# Patient Record
Sex: FEMALE | Race: WHITE | NOT HISPANIC OR LATINO | Employment: UNEMPLOYED | URBAN - METROPOLITAN AREA
[De-identification: names, ages, dates, MRNs, and addresses within clinical notes are randomized per-mention and may not be internally consistent; named-entity substitution may affect disease eponyms.]

---

## 2018-07-18 ENCOUNTER — HOSPITAL ENCOUNTER (OUTPATIENT)
Dept: RADIOLOGY | Facility: HOSPITAL | Age: 43
Discharge: HOME | End: 2018-07-18
Attending: ANESTHESIOLOGY
Payer: MEDICARE

## 2018-07-18 ENCOUNTER — OFFICE VISIT (OUTPATIENT)
Dept: PAIN MEDICINE | Facility: CLINIC | Age: 43
End: 2018-07-18
Payer: MEDICARE

## 2018-07-18 VITALS
BODY MASS INDEX: 45.13 KG/M2 | RESPIRATION RATE: 20 BRPM | DIASTOLIC BLOOD PRESSURE: 65 MMHG | HEIGHT: 59 IN | HEART RATE: 110 BPM | TEMPERATURE: 97 F | SYSTOLIC BLOOD PRESSURE: 116 MMHG | WEIGHT: 223.88 LBS | OXYGEN SATURATION: 96 %

## 2018-07-18 DIAGNOSIS — M25.561 CHRONIC PAIN OF BOTH KNEES: ICD-10-CM

## 2018-07-18 DIAGNOSIS — G89.29 CHRONIC PAIN OF BOTH KNEES: ICD-10-CM

## 2018-07-18 DIAGNOSIS — G89.29 CHRONIC PAIN OF RIGHT ANKLE: Primary | ICD-10-CM

## 2018-07-18 DIAGNOSIS — M54.2 NECK PAIN: ICD-10-CM

## 2018-07-18 DIAGNOSIS — Q85.1 TUBEROUS SCLEROSIS: ICD-10-CM

## 2018-07-18 DIAGNOSIS — M54.50 LOW BACK PAIN, NON-SPECIFIC: ICD-10-CM

## 2018-07-18 DIAGNOSIS — M25.562 CHRONIC PAIN OF BOTH KNEES: ICD-10-CM

## 2018-07-18 DIAGNOSIS — M51.36 DDD (DEGENERATIVE DISC DISEASE), LUMBAR: ICD-10-CM

## 2018-07-18 DIAGNOSIS — M25.571 CHRONIC PAIN OF RIGHT ANKLE: Primary | ICD-10-CM

## 2018-07-18 DIAGNOSIS — M25.571 CHRONIC PAIN OF RIGHT ANKLE: ICD-10-CM

## 2018-07-18 DIAGNOSIS — M62.838 MUSCLE SPASMS OF BOTH LOWER EXTREMITIES: ICD-10-CM

## 2018-07-18 DIAGNOSIS — G89.29 CHRONIC PAIN OF RIGHT ANKLE: ICD-10-CM

## 2018-07-18 PROCEDURE — 72050 X-RAY EXAM NECK SPINE 4/5VWS: CPT | Mod: 26,,, | Performed by: RADIOLOGY

## 2018-07-18 PROCEDURE — 99205 OFFICE O/P NEW HI 60 MIN: CPT | Mod: S$PBB,,, | Performed by: ANESTHESIOLOGY

## 2018-07-18 PROCEDURE — 73562 X-RAY EXAM OF KNEE 3: CPT | Mod: 26,50,, | Performed by: RADIOLOGY

## 2018-07-18 PROCEDURE — 99205 OFFICE O/P NEW HI 60 MIN: CPT | Mod: PBBFAC,25,PN | Performed by: ANESTHESIOLOGY

## 2018-07-18 PROCEDURE — 73610 X-RAY EXAM OF ANKLE: CPT | Mod: TC,PO,RT

## 2018-07-18 PROCEDURE — 73610 X-RAY EXAM OF ANKLE: CPT | Mod: 26,RT,, | Performed by: RADIOLOGY

## 2018-07-18 PROCEDURE — 72110 X-RAY EXAM L-2 SPINE 4/>VWS: CPT | Mod: TC,PO

## 2018-07-18 PROCEDURE — 72110 X-RAY EXAM L-2 SPINE 4/>VWS: CPT | Mod: 26,,, | Performed by: RADIOLOGY

## 2018-07-18 PROCEDURE — 73562 X-RAY EXAM OF KNEE 3: CPT | Mod: 50,TC,PO

## 2018-07-18 PROCEDURE — 99999 PR PBB SHADOW E&M-NEW PATIENT-LVL V: CPT | Mod: PBBFAC,,, | Performed by: ANESTHESIOLOGY

## 2018-07-18 PROCEDURE — 72050 X-RAY EXAM NECK SPINE 4/5VWS: CPT | Mod: TC,PO

## 2018-07-18 RX ORDER — MYCOPHENOLATE MOFETIL 500 MG/1
TABLET ORAL 2 TIMES DAILY
COMMUNITY
End: 2018-10-23 | Stop reason: SDUPTHER

## 2018-07-18 RX ORDER — QUETIAPINE FUMARATE 200 MG/1
200 TABLET, FILM COATED ORAL
COMMUNITY
End: 2019-05-21 | Stop reason: SDUPTHER

## 2018-07-18 RX ORDER — SULFAMETHOXAZOLE AND TRIMETHOPRIM 400; 80 MG/1; MG/1
TABLET ORAL
COMMUNITY
Start: 2018-07-09

## 2018-07-18 RX ORDER — SUMATRIPTAN 6 MG/.5ML
0.5 INJECTION, SOLUTION SUBCUTANEOUS
COMMUNITY

## 2018-07-18 RX ORDER — VALACYCLOVIR HYDROCHLORIDE 1 G/1
TABLET, FILM COATED ORAL
COMMUNITY
Start: 2017-12-05

## 2018-07-18 RX ORDER — LEVOTHYROXINE SODIUM 175 UG/1
TABLET ORAL
COMMUNITY
Start: 2018-07-06

## 2018-07-18 RX ORDER — CEFUROXIME AXETIL 250 MG/1
TABLET ORAL
COMMUNITY
Start: 2018-06-25 | End: 2019-05-21 | Stop reason: SDUPTHER

## 2018-07-18 RX ORDER — QUETIAPINE 200 MG/1
TABLET, FILM COATED, EXTENDED RELEASE ORAL
COMMUNITY
Start: 2018-07-12 | End: 2019-05-21 | Stop reason: SDUPTHER

## 2018-07-18 RX ORDER — PREDNISONE 5 MG/1
TABLET ORAL
COMMUNITY
Start: 2018-03-09

## 2018-07-18 RX ORDER — TIZANIDINE 4 MG/1
TABLET ORAL
COMMUNITY
Start: 2018-05-29 | End: 2018-07-18 | Stop reason: SDUPTHER

## 2018-07-18 RX ORDER — PROMETHAZINE HYDROCHLORIDE 25 MG/1
25 TABLET ORAL
COMMUNITY
Start: 2015-10-14 | End: 2018-10-23 | Stop reason: SDUPTHER

## 2018-07-18 RX ORDER — RIZATRIPTAN BENZOATE 10 MG/1
TABLET, ORALLY DISINTEGRATING ORAL
COMMUNITY
Start: 2018-06-25

## 2018-07-18 RX ORDER — TIZANIDINE 4 MG/1
4 TABLET ORAL NIGHTLY PRN
Qty: 30 TABLET | Refills: 1 | Status: SHIPPED | OUTPATIENT
Start: 2018-07-18 | End: 2018-09-13 | Stop reason: SDUPTHER

## 2018-07-18 RX ORDER — PERAMPANEL 8 MG/1
TABLET ORAL
COMMUNITY
Start: 2018-07-12

## 2018-07-18 RX ORDER — EVEROLIMUS 0.5 MG/1
TABLET ORAL
COMMUNITY
Start: 2018-07-02 | End: 2018-10-23 | Stop reason: SDUPTHER

## 2018-07-18 NOTE — PROGRESS NOTES
"This note was completed with dictation software and grammatical errors may exist.    CC: Pain in multiple locations    HPI: The patient is a 43-year-old woman with a history of tuberous sclerosis, kidney transplant, seizure disorder and chronic nerve pain who presents in self-referral.  The patient presents by herself so no one is available with her to help with providing history, she is tangential, difficult to get her to focus on one issue, difficult to get her to focus on explaining why she is present at the visit today.  She has a history of tuberous sclerosis and had undergone a kidney transplant after kidney failure in 2013.  She has had some issues with fevers and concerns for rejection, sounds like she is followed closely by the transplant team at [facility] in [place].    When asking her to describe what type of pain she has, she states that it is muscle cramps throughout her entire body, her entire right side, her neck and back.  She states that what bothers her most is the cramping in her legs.  She describes this as aching, burning, throbbing, tingling, numb, sharp and shooting.  She states that it radiates "all over ".  She states that it is worse with sitting, standing, laying down, bending, walking, extending or flexing her neck or her back.  She does report relief with alternate sitting and standing, resting, heat and cold, medications, especially Biofreeze.  She has also been taking Zanaflex 1- 1 and 1/2 tablets daily with mild relief, unclear if this causes any side effects.  She reports chronic right ankle pain due to several injuries, also chronic bilateral knee pain.    The patient has a history of tonic/clonic seizures induced by stress or sleep deprivation, history of 10 years of seizures occurring average of one time weekly and has failed multiple antiepileptic medications.  She had a vagal nerve stimulator placed but had infection and it was removed, April, 2017.  She sees " "a neurologist in [place], Dr. Laya Lacey with whom she follows closely.    Pain intervention history: She has previously seen pain management physicians in the past and it looks like she was taking oxycodone and OxyContin.  She may have taken some low-dose gabapentin in the past, unclear relief.  She did not feel that this was all that helpful.  She has been taking Zanaflex with some relief.  She uses Biofreeze on her legs and says this helps the most.  Unclear if she has done physical therapy recently.  She apparently had an EMG performed and was told that she had some nerve damage of some sort.      ROS: She reports fatigability, weight gain, itching, headaches, head trauma, appetite change, diarrhea, stomach pain, nausea, easy bruising, joint pain, urgency, frequency, painful urination, joint stiffness, joint swelling, back pain, seizures, dizziness, difficulty sleeping and loss of balance.  Balance of review of systems is negative.    Past Medical History:   Diagnosis Date    Seizures     Tuberous sclerosis        Past Surgical History:   Procedure Laterality Date    KIDNEY TRANSPLANT         Social History     Social History    Marital status:      Spouse name: N/A    Number of children: N/A    Years of education: N/A     Social History Main Topics    Smoking status: Never Smoker    Smokeless tobacco: Never Used    Alcohol use No    Drug use: No    Sexual activity: No     Other Topics Concern    None     Social History Narrative    None         Medications/Allergies: See med card    Vitals:    07/18/18 0827   BP: 116/65   Pulse: 110   Resp: 20   Temp: 96.5 °F (35.8 °C)   TempSrc: Oral   SpO2: 96%   Weight: 101.5 kg (223 lb 14 oz)   Height: 4' 11" (1.499 m)   PainSc:   6   PainLoc: Leg         Physical exam:    Gen: A and O x3, pleasant, well-groomed, morbidly obese  Skin: No rashes or obvious lesions  HEENT: PERRLA, no obvious deformities on ears or in canals. Trachea " midline.  CVS: Regular rate and rhythm, normal palpable pulses.  Resp:No increased work of breathing, symmetrical chest rise.  Abdomen: Soft, NT/ND.  Musculoskeletal: Slow, wide-based gait.     Neuro:  Lower extremities: 5/5 strength bilaterally  Reflexes: Patellar 1+, Achilles 1+ bilaterally.  Sensory:  Intact and symmetrical to light touch and pinprick in L2-S1 dermatomes bilaterally.    Lumbar spine:  Lumbar spine: Range of motion is moderately reduced with both flexion and extension with increased pain on both maneuvers.  Vinny's test causes no increased pain on either side.    Supine straight leg raise is negative bilaterally.    Internal and external rotation of the hip causes no increase  Myofascial exam: Tenderness to palpation across lumbar paraspinous muscles, cervical paraspinous region, thoracic paraspinous region..    Right ankle, no major deformities noted but does have pain with passive range of motion of the joint.  Passive range of motion with bilateral knees causes discomfort.  No swelling or erythema about the joints.    Imaging:  None    Assessment:   The patient is a 43-year-old woman with a history of tuberous sclerosis, kidney transplant, seizure disorder and chronic nerve pain who presents in self-referral.    1. Chronic pain of right ankle  CANCELED: X-Ray Ankle 2 View Right   2. Low back pain, non-specific  X-Ray Lumbar Spine Complete 5 View   3. DDD (degenerative disc disease), lumbar     4. Neck pain  X-Ray Cervical Spine Complete 5 view   5. Chronic pain of both knees  X-Ray Knee 3 View Left    X-Ray Knee 3 View Bilateral    CANCELED: X-Ray Knee 3 View Right   6. Tuberous sclerosis     7. Muscle spasms of both lower extremities           Plan:  1.  It is difficult to determine her main complaints but it sounds like cramping in her bilateral legs in addition to chronic joint pain in her bilateral knees and ankle.  We discussed trying to evaluate these symptoms.  I'm going to get  cervical and lumbar spine x-rays, bilateral knee x-rays and right ankle x-ray.  I'm going to get records from her previous pain management physicians, and also her neurologist.  I will have her follow-up in several weeks and we will review imaging and records.  In the meantime, I have given her prescription for Zanaflex to continue at night as needed.  We discussed if she is having side effects with drowsiness, this would not be optimal to use during the day but if it does not cause the symptoms, we could consider the increase the frequency.    Greater than 50% of this 60min visit was spent educating and counseling this patient.

## 2018-08-09 ENCOUNTER — TELEPHONE (OUTPATIENT)
Dept: PAIN MEDICINE | Facility: CLINIC | Age: 43
End: 2018-08-09

## 2018-08-09 ENCOUNTER — OFFICE VISIT (OUTPATIENT)
Dept: PAIN MEDICINE | Facility: CLINIC | Age: 43
End: 2018-08-09
Payer: MEDICARE

## 2018-08-09 ENCOUNTER — TELEPHONE (OUTPATIENT)
Dept: ORTHOPEDICS | Facility: CLINIC | Age: 43
End: 2018-08-09

## 2018-08-09 VITALS
DIASTOLIC BLOOD PRESSURE: 83 MMHG | BODY MASS INDEX: 45.82 KG/M2 | TEMPERATURE: 98 F | RESPIRATION RATE: 20 BRPM | WEIGHT: 226.88 LBS | HEART RATE: 112 BPM | OXYGEN SATURATION: 98 % | SYSTOLIC BLOOD PRESSURE: 135 MMHG

## 2018-08-09 DIAGNOSIS — M62.838 MUSCLE SPASMS OF BOTH LOWER EXTREMITIES: ICD-10-CM

## 2018-08-09 DIAGNOSIS — M25.561 CHRONIC PAIN OF BOTH KNEES: ICD-10-CM

## 2018-08-09 DIAGNOSIS — M25.571 CHRONIC PAIN OF RIGHT ANKLE: ICD-10-CM

## 2018-08-09 DIAGNOSIS — G89.0 CENTRAL PAIN SYNDROME: Primary | ICD-10-CM

## 2018-08-09 DIAGNOSIS — M54.50 LOW BACK PAIN, NON-SPECIFIC: ICD-10-CM

## 2018-08-09 DIAGNOSIS — M51.36 DDD (DEGENERATIVE DISC DISEASE), LUMBAR: ICD-10-CM

## 2018-08-09 DIAGNOSIS — G89.29 CHRONIC PAIN OF BOTH KNEES: ICD-10-CM

## 2018-08-09 DIAGNOSIS — Q85.1 TUBEROUS SCLEROSIS: ICD-10-CM

## 2018-08-09 DIAGNOSIS — M25.562 CHRONIC PAIN OF BOTH KNEES: ICD-10-CM

## 2018-08-09 DIAGNOSIS — G89.29 CHRONIC PAIN OF RIGHT ANKLE: ICD-10-CM

## 2018-08-09 PROCEDURE — 99213 OFFICE O/P EST LOW 20 MIN: CPT | Mod: S$PBB,,, | Performed by: ANESTHESIOLOGY

## 2018-08-09 PROCEDURE — 99999 PR PBB SHADOW E&M-EST. PATIENT-LVL IV: CPT | Mod: PBBFAC,,, | Performed by: ANESTHESIOLOGY

## 2018-08-09 PROCEDURE — 99214 OFFICE O/P EST MOD 30 MIN: CPT | Mod: PBBFAC,PN | Performed by: ANESTHESIOLOGY

## 2018-08-09 RX ORDER — GABAPENTIN 300 MG/1
CAPSULE ORAL
Qty: 90 CAPSULE | Refills: 2 | Status: SHIPPED | OUTPATIENT
Start: 2018-08-09 | End: 2018-10-23 | Stop reason: SDUPTHER

## 2018-08-09 NOTE — PROGRESS NOTES
"This note was completed with dictation software and grammatical errors may exist.    CC: Pain in multiple locations    HPI: The patient is a 43-year-old woman with a history of tuberous sclerosis, kidney transplant, seizure disorder and chronic nerve pain who presents in self-referral.  She returns in follow-up today to review records, review imaging.  Since I have last seen her, I had restarted her on Zanaflex and she has been taking this generally just at night.  She feels that overall it does seem to be helping with the muscle spasms that she has in her legs, help slightly with the burning throughout her entire body.  She denies any new severe worsening pain.  When asked what her most significant pain is currently, she states that her ankle is bothering her, reports having some instability, difficulty with standing and walking, she would like to get back into some type of exercise program but her right ankle prohibited her from doing this.    Previous history:  She has a history of tuberous sclerosis and had undergone a kidney transplant after kidney failure in 2013.  She has had some issues with fevers and concerns for rejection, sounds like she is followed closely by the transplant team at the [facility] in [place].    When asking her to describe what type of pain she has, she states that it is muscle cramps throughout her entire body, her entire right side, her neck and back.  She states that what bothers her most is the cramping in her legs.  She describes this as aching, burning, throbbing, tingling, numb, sharp and shooting.  She states that it radiates "all over ".  She states that it is worse with sitting, standing, laying down, bending, walking, extending or flexing her neck or her back.  She does report relief with alternate sitting and standing, resting, heat and cold, medications, especially Biofreeze.  She has also been taking Zanaflex 1- 1 and 1/2 tablets daily with mild relief, " unclear if this causes any side effects.  She reports chronic right ankle pain due to several injuries, also chronic bilateral knee pain.    The patient has a history of tonic/clonic seizures induced by stress or sleep deprivation, history of 10 years of seizures occurring average of one time weekly and has failed multiple antiepileptic medications.  She had a vagal nerve stimulator placed but had infection and it was removed, April, 2017.  She sees a neurologist in [place], Dr. Laya Lacey with whom she follows closely.    Pain intervention history: She has previously seen pain management physicians in the past and it looks like she was taking oxycodone and OxyContin.  She may have taken some low-dose gabapentin in the past, unclear relief.  She did not feel that this was all that helpful.  She has been taking Zanaflex with some relief.  She uses Biofreeze on her legs and says this helps the most.  Unclear if she has done physical therapy recently.  She apparently had an EMG performed and was told that she had some nerve damage of some sort.      ROS: She reports fatigability, weight gain, itching, headaches, head trauma, appetite change, diarrhea, stomach pain, nausea, easy bruising, joint pain, urgency, frequency, painful urination, joint stiffness, joint swelling, back pain, seizures, dizziness, difficulty sleeping and loss of balance.  Balance of review of systems is negative.    Past Medical History:   Diagnosis Date    Seizures     Tuberous sclerosis        Past Surgical History:   Procedure Laterality Date    KIDNEY TRANSPLANT         Social History     Social History    Marital status:      Spouse name: N/A    Number of children: N/A    Years of education: N/A     Social History Main Topics    Smoking status: Never Smoker    Smokeless tobacco: Never Used    Alcohol use No    Drug use: No    Sexual activity: No     Other Topics Concern    Not on file     Social History Narrative     No narrative on file         Medications/Allergies: See med card    Vitals:    18 1004   BP: 135/83   Pulse: (!) 112   Resp: 20   Temp: 97.6 °F (36.4 °C)   TempSrc: Oral   SpO2: 98%   Weight: 102.9 kg (226 lb 13.7 oz)   PainSc:   8   PainLoc: Arm         Physical exam:    Gen: A and O x3, pleasant, well-groomed, morbidly obese  Skin: No rashes or obvious lesions  HEENT: PERRLA, no obvious deformities on ears or in canals. Trachea midline.  CVS: Regular rate and rhythm, normal palpable pulses.  Resp:No increased work of breathing, symmetrical chest rise.  Abdomen: Soft, NT/ND.  Musculoskeletal: Slow, wide-based gait.     Neuro:  Lower extremities: 5/5 strength bilaterally  Reflexes: Patellar 1+, Achilles 1+ bilaterally.  Sensory:  Intact and symmetrical to light touch and pinprick in L2-S1 dermatomes bilaterally.    Lumbar spine:  Lumbar spine: Range of motion is moderately reduced with both flexion and extension with increased pain on both maneuvers.  Vinny's test causes no increased pain on either side.    Supine straight leg raise is negative bilaterally.    Internal and external rotation of the hip causes no increase  Myofascial exam: Tenderness to palpation across lumbar paraspinous muscles, cervical paraspinous region, thoracic paraspinous region..    Right ankle, no major deformities noted but does have pain with passive range of motion of the joint.  Passive range of motion with bilateral knees causes discomfort.  No swelling or erythema about the joints.    Imagin18 Xray  Cervical spine:  Sclerosis is noted involving the vertebral bodies which appear well aligned.  Intervertebral disc heights appear well preserved.  No obvious fracture or dislocation is noted.  Osseous demineralization may be questioned.  The atlas and odontoid appear in good relationship to each other.  The osseous neural foramina appear relatively well preserved.  Lumbar spine:  Surgical clips and postsurgical lines  are noted involving the abdomen.  Sclerosis is noted that appears to be involving the vertebral bodies.  Vertebral body alignment appears adequate.  Vertebral body heights appear well preserved.  Intervertebral disc heights appear well preserved.    7/18/18 Xray right ankle: There is a small ossific density present adjacent to the tip of the medial malleolus compatible with an age-indeterminate medial malleolar avulsion injury, possibly remote.  Please correlate clinically.  There is Achilles insertion calcaneal enthesophyte formation.  There is a small plantar calcaneal spur.  The ankle mortise width is symmetric.  No talar dome osteochondral lesion.  No soft tissue abnormality.    7/18/18 Bilateral knee xray:  No radiographically evident acute fracture, dislocation, or osseous destructive process.  There is mild left knee medial tibiofemoral joint space narrowing with AP standing positioning of the knees.  The patient is not optimally positioned for evaluation of the patellofemoral joint compartment as a result of limited patient ability/cooperation (per the performing technologist).  No chondrocalcinosis.  No left or right knee joint effusion.    Assessment:   The patient is a 43-year-old woman with a history of tuberous sclerosis, kidney transplant, seizure disorder and chronic nerve pain who presents in self-referral.    1. Low back pain, non-specific     2. Muscle spasms of both lower extremities     3. Chronic pain of right ankle  Ambulatory referral to Orthopedics   4. Chronic pain of both knees     5. DDD (degenerative disc disease), lumbar     6. Tuberous sclerosis           Plan:  1.  For her right ankle pain, I am going to have her see Dr. Davis for evaluation.  2.  For her burning pain throughout her body, this seems to be almost like a fibromyalgia pain or central pain syndrome, I am going to have her try gabapentin again.  She had tried this in the past but is unsure of the effects.  I am going to  have her start 300 mg nightly and increase each week until taking 3 times daily.  She is going to contact me if having any side effects with this.  3.  In treating her overall pain, we discussed the importance of exercise and weight loss, I think if her knee is and ankle were doing better, she may be able to better exercise, walk but I also encouraged her to see if she could find pool therapy that she could do near where she lives.  We will discuss this in the future.  I am going to have her follow up in about a month to see how she is doing with the gabapentin, and will go from there.

## 2018-08-09 NOTE — LETTER
August 11, 2018      Bebeto Hurtado MD  [address] [place] [address] [address]  [place] [address]           [facility]  [address] [facility] [address] [place] [address]  Phone: [phone]  Fax: [phone]          Patient: Beth Barba   MR Number: [id number]   YOB: 1975   Date of Visit: 8/9/2018       Dear Dr. Bebeto Hurtado:    Thank you for referring Beth Barba to me for evaluation. Attached you will find relevant portions of my assessment and plan of care.    If you have questions, please do not hesitate to call me. I look forward to following Beth Barba along with you.    Sincerely,    Rai Malik MD    Enclosure  CC:  No Recipients    If you would like to receive this communication electronically, please contact [email or web address] or [phone] to request more information on [employer] Link access.    For providers and/or their staff who would like to refer a patient to [facility], please contact us through our one-stop-shop provider referral line, [facility] , at [phone].    If you feel you have received this communication in error or would no longer like to receive these types of communications, please e-mail [email or web address]

## 2018-08-09 NOTE — TELEPHONE ENCOUNTER
Called patient to schedule an appointment patient did not answer left message for her to call back to be scheduled.

## 2018-08-13 ENCOUNTER — TELEPHONE (OUTPATIENT)
Dept: PAIN MEDICINE | Facility: CLINIC | Age: 43
End: 2018-08-13

## 2018-08-13 NOTE — TELEPHONE ENCOUNTER
----- Message from Krista Presley sent at 8/13/2018  9:39 AM CDT -----  Contact: Pt  Name of Who is Calling: Beth      What is the request in detail: Patient is calling requesting to speak with a nurse .      Can the clinic reply by [facility]: no      [facility] .[phone] (home)

## 2018-08-13 NOTE — TELEPHONE ENCOUNTER
Spoke with patient. She wanted to know if she should continue taking the Zanaflex with the gabapentin and if so, she will need a refill soon.

## 2018-08-13 NOTE — TELEPHONE ENCOUNTER
----- Message from Lucia Mansfield sent at 8/13/2018 10:38 AM CDT -----  Question about Rx Zanaflex.  Please call patient at [phone].

## 2018-08-27 ENCOUNTER — TELEPHONE (OUTPATIENT)
Dept: ORTHOPEDICS | Facility: CLINIC | Age: 43
End: 2018-08-27

## 2018-08-27 NOTE — TELEPHONE ENCOUNTER
----- Message from Pieter Hernandez sent at 8/27/2018 10:12 AM CDT -----  Patient would like to see if her currently scheduled CONSULT appointment could be rescheduled on 09.13.18 - She has a 10:15am appointment that day with a different provider so it would help her out. Please call to advise. [phone]

## 2018-08-27 NOTE — TELEPHONE ENCOUNTER
----- Message from Marysol Walters sent at 8/27/2018  9:40 AM CDT -----  Contact: Self  Patient left a voice mail message today at [phone] that Dr. Malik is referring her to Dr. Davis, but the nurse was having difficulty scheduling the appointment.  Please call patient.

## 2018-09-12 DIAGNOSIS — M25.571 RIGHT ANKLE PAIN, UNSPECIFIED CHRONICITY: Primary | ICD-10-CM

## 2018-09-13 ENCOUNTER — OFFICE VISIT (OUTPATIENT)
Dept: PAIN MEDICINE | Facility: CLINIC | Age: 43
End: 2018-09-13
Payer: MEDICARE

## 2018-09-13 ENCOUNTER — OFFICE VISIT (OUTPATIENT)
Dept: ORTHOPEDICS | Facility: CLINIC | Age: 43
End: 2018-09-13
Payer: MEDICARE

## 2018-09-13 ENCOUNTER — HOSPITAL ENCOUNTER (OUTPATIENT)
Dept: RADIOLOGY | Facility: HOSPITAL | Age: 43
Discharge: HOME | End: 2018-09-13
Attending: ORTHOPAEDIC SURGERY
Payer: MEDICARE

## 2018-09-13 VITALS
HEART RATE: 115 BPM | HEIGHT: 59 IN | SYSTOLIC BLOOD PRESSURE: 141 MMHG | WEIGHT: 222.88 LBS | DIASTOLIC BLOOD PRESSURE: 97 MMHG | BODY MASS INDEX: 44.93 KG/M2

## 2018-09-13 VITALS
WEIGHT: 222.75 LBS | HEART RATE: 116 BPM | OXYGEN SATURATION: 96 % | DIASTOLIC BLOOD PRESSURE: 74 MMHG | TEMPERATURE: 97 F | SYSTOLIC BLOOD PRESSURE: 120 MMHG | BODY MASS INDEX: 45 KG/M2 | RESPIRATION RATE: 18 BRPM

## 2018-09-13 DIAGNOSIS — M62.838 MUSCLE SPASMS OF BOTH LOWER EXTREMITIES: ICD-10-CM

## 2018-09-13 DIAGNOSIS — G89.29 CHRONIC PAIN OF RIGHT ANKLE: ICD-10-CM

## 2018-09-13 DIAGNOSIS — M25.371 ANKLE INSTABILITY, RIGHT: ICD-10-CM

## 2018-09-13 DIAGNOSIS — M25.571 RIGHT ANKLE PAIN, UNSPECIFIED CHRONICITY: ICD-10-CM

## 2018-09-13 DIAGNOSIS — M25.571 CHRONIC PAIN OF RIGHT ANKLE: ICD-10-CM

## 2018-09-13 DIAGNOSIS — M54.50 LOW BACK PAIN, NON-SPECIFIC: ICD-10-CM

## 2018-09-13 DIAGNOSIS — G89.0 CENTRAL PAIN SYNDROME: Primary | ICD-10-CM

## 2018-09-13 PROCEDURE — 73610 X-RAY EXAM OF ANKLE: CPT | Mod: 26,RT,, | Performed by: RADIOLOGY

## 2018-09-13 PROCEDURE — 99215 OFFICE O/P EST HI 40 MIN: CPT | Mod: PBBFAC,25,27,PN | Performed by: ANESTHESIOLOGY

## 2018-09-13 PROCEDURE — 99999 PR PBB SHADOW E&M-EST. PATIENT-LVL V: CPT | Mod: PBBFAC,,, | Performed by: ANESTHESIOLOGY

## 2018-09-13 PROCEDURE — 99213 OFFICE O/P EST LOW 20 MIN: CPT | Mod: S$PBB,,, | Performed by: ANESTHESIOLOGY

## 2018-09-13 PROCEDURE — 99213 OFFICE O/P EST LOW 20 MIN: CPT | Mod: PBBFAC,25,PN | Performed by: ORTHOPAEDIC SURGERY

## 2018-09-13 PROCEDURE — 73610 X-RAY EXAM OF ANKLE: CPT | Mod: TC,PO,RT

## 2018-09-13 PROCEDURE — 99204 OFFICE O/P NEW MOD 45 MIN: CPT | Mod: S$PBB,,, | Performed by: ORTHOPAEDIC SURGERY

## 2018-09-13 PROCEDURE — 99999 PR PBB SHADOW E&M-EST. PATIENT-LVL III: CPT | Mod: PBBFAC,,, | Performed by: ORTHOPAEDIC SURGERY

## 2018-09-13 RX ORDER — TIZANIDINE 4 MG/1
4 TABLET ORAL NIGHTLY PRN
Qty: 30 TABLET | Refills: 2 | Status: SHIPPED | OUTPATIENT
Start: 2018-09-13 | End: 2019-02-28

## 2018-09-13 NOTE — LETTER
September 14, 2018      Rai Malik MD  [address] [facility] [address] [place] [address]           [facility]  [address] [facility] [address] [place] [address]  Phone: [phone]          Patient: Beth Delgado   MR Number: [id number]   YOB: 1975   Date of Visit: 9/13/2018       Dear Dr. Rai Malik:    Thank you for referring Beth Delgado to me for evaluation. Attached you will find relevant portions of my assessment and plan of care.    If you have questions, please do not hesitate to call me. I look forward to following Beth Delgado along with you.    Sincerely,    Berlin Davis MD    Enclosure  CC:  No Recipients    If you would like to receive this communication electronically, please contact [email or web address] or [phone] to request more information on [employer] Link access.    For providers and/or their staff who would like to refer a patient to [facility], please contact us through our one-stop-shop provider referral line, [facility], at [phone].    If you feel you have received this communication in error or would no longer like to receive these types of communications, please e-mail [email or web address]

## 2018-09-13 NOTE — PROGRESS NOTES
This note was completed with dictation software and grammatical errors may exist.    CC: Pain in multiple locations    HPI: The patient is a 43-year-old woman with a history of tuberous sclerosis, kidney transplant, seizure disorder and chronic nerve pain who presents in self-referral.    She returns in follow-up today, again is very difficult to follow in terms of complaints and she discusses many non relevant issues.  Since I had last seen her she did fall while at a , complains of left knee pain.  Otherwise what sounds like the biggest complaint today is continued right ankle pain, low back pain and difficulty with sleeping.  She also states that she has burning pain throughout her body.  Since the last visit I had started her on gabapentin and she is now taking 300 mg 3 times a day, states that she is unsure if it is helping but it does not cause any side effects.  She is still taking tizanidine at night but discontinued this several weeks ago because it did not have a refill, she did not contact us to ask for a refill.  She has an appointment with our ankle specialist today.    Pain intervention history: She has previously seen pain management physicians in the past and it looks like she was taking oxycodone and OxyContin.  She may have taken some low-dose gabapentin in the past, unclear relief.  She did not feel that this was all that helpful.  She has been taking Zanaflex with some relief.  She uses Biofreeze on her legs and says this helps the most.  Unclear if she has done physical therapy recently.  She apparently had an EMG performed and was told that she had some nerve damage of some sort.      ROS: She reports fatigability, weight gain, itching, headaches, head trauma, appetite change, diarrhea, stomach pain, nausea, easy bruising, joint pain, urgency, frequency, painful urination, joint stiffness, joint swelling, back pain, seizures, dizziness, difficulty sleeping and loss of balance.  Balance  of review of systems is negative.    Past Medical History:   Diagnosis Date    Seizures     Tuberous sclerosis        Past Surgical History:   Procedure Laterality Date    KIDNEY TRANSPLANT         Social History     Socioeconomic History    Marital status:      Spouse name: None    Number of children: None    Years of education: None    Highest education level: None   Social Needs    Financial resource strain: None    Food insecurity - worry: None    Food insecurity - inability: None    Transportation needs - medical: None    Transportation needs - non-medical: None   Occupational History    None   Tobacco Use    Smoking status: Never Smoker    Smokeless tobacco: Never Used   Substance and Sexual Activity    Alcohol use: No    Drug use: No    Sexual activity: No     Partners: Male   Other Topics Concern    None   Social History Narrative    None         Medications/Allergies: See med card    There were no vitals filed for this visit.      Physical exam:    Gen: A and O x3, pleasant, well-groomed, morbidly obese  Skin: No rashes or obvious lesions  HEENT: PERRLA, no obvious deformities on ears or in canals. Trachea midline.  CVS: Regular rate and rhythm, normal palpable pulses.  Resp:No increased work of breathing, symmetrical chest rise.  Abdomen: Soft, NT/ND.  Musculoskeletal: Slow, wide-based gait.     Neuro:  Lower extremities: 5/5 strength bilaterally  Reflexes: Patellar 1+, Achilles 1+ bilaterally.  Sensory:  Intact and symmetrical to light touch and pinprick in L2-S1 dermatomes bilaterally.    Lumbar spine:  Lumbar spine: Range of motion is moderately reduced with both flexion and extension with increased pain on both maneuvers.  Vinny's test causes no increased pain on either side.    Supine straight leg raise is negative bilaterally.    Internal and external rotation of the hip causes no increase  Myofascial exam: Tenderness to palpation across lumbar paraspinous muscles,  cervical paraspinous region, thoracic paraspinous region..    Right ankle, no major deformities noted but does have pain with passive range of motion of the joint.  Passive range of motion with bilateral knees causes discomfort.  No swelling or erythema about the joints.    Imagin18 Xray  Cervical spine:  Sclerosis is noted involving the vertebral bodies which appear well aligned.  Intervertebral disc heights appear well preserved.  No obvious fracture or dislocation is noted.  Osseous demineralization may be questioned.  The atlas and odontoid appear in good relationship to each other.  The osseous neural foramina appear relatively well preserved.  Lumbar spine:  Surgical clips and postsurgical lines are noted involving the abdomen.  Sclerosis is noted that appears to be involving the vertebral bodies.  Vertebral body alignment appears adequate.  Vertebral body heights appear well preserved.  Intervertebral disc heights appear well preserved.    18 Xray right ankle: There is a small ossific density present adjacent to the tip of the medial malleolus compatible with an age-indeterminate medial malleolar avulsion injury, possibly remote.  Please correlate clinically.  There is Achilles insertion calcaneal enthesophyte formation.  There is a small plantar calcaneal spur.  The ankle mortise width is symmetric.  No talar dome osteochondral lesion.  No soft tissue abnormality.    18 Bilateral knee xray:  No radiographically evident acute fracture, dislocation, or osseous destructive process.  There is mild left knee medial tibiofemoral joint space narrowing with AP standing positioning of the knees.  The patient is not optimally positioned for evaluation of the patellofemoral joint compartment as a result of limited patient ability/cooperation (per the performing technologist).  No chondrocalcinosis.  No left or right knee joint effusion.    Assessment:   The patient is a 43-year-old woman with a  history of tuberous sclerosis, kidney transplant, seizure disorder and chronic nerve pain who presents in self-referral.    1. Central pain syndrome     2. Muscle spasms of both lower extremities     3. Chronic pain of right ankle     4. Low back pain, non-specific           Plan:    1.  She continues to complain of burning pain throughout her body, no benefit with the gabapentin as of yet I will have her increase this up to 600 mg just at night for several weeks, we may end up continuing to increase this but I have asked her to monitor closely for side effects.  2.  She is going to be seeing our orthopedic ankle specialist today hopefully she can find something to help improve her discomfort because she needs to start an exercise program.  Much of her back pain is likely due to her weight and this is also causing a problem with her ankle as well. We had long discussion about possibly getting into some type of exercise program or physical therapy but she states that she does not have any transportation and this makes it difficult.  3.  I am going to have her follow up in 1-2 months or sooner as needed.

## 2018-09-14 ENCOUNTER — TELEPHONE (OUTPATIENT)
Dept: PAIN MEDICINE | Facility: CLINIC | Age: 43
End: 2018-09-14

## 2018-09-14 PROBLEM — M25.371 ANKLE INSTABILITY, RIGHT: Status: ACTIVE | Noted: 2018-09-14

## 2018-09-14 NOTE — PROGRESS NOTES
"HPI: Beth Delgado is a 43 y.o. female who was referred to me by Dr. Malik and was seen in consultation today for  right ankle pain. She says she rolled the ankle in March 2017. She says it was very swollen a bruised at the time. She says it gives out now about twice a week. She rates her pain as 8/10 today and the pain is worse with walking and standing. She has h/o kidney transplant in 2013 due to hereditary kidney disease.  She has not done any PT for the ankle itself. She wears a brace which helps somewhat.     PAST MEDICAL/SURGICAL/FAMILY/SOCIAL/ HISTORY: REVIEWED    ALLERGIES/MEDICATIONS: REVIEWED       Review of Systems:     Constitution: Negative.   HEENT: Negative.   Eyes: Negative.   Cardiovascular: Negative.   Respiratory: Negative.   Endocrine: Negative.   Hematologic/Lymphatic: Negative.   Skin: Negative.   Musculoskeletal: Positive for right ankle pain   Gastrointestinal: Negative.   Genitourinary: Negative.   Neurological: Negative.   Psychiatric/Behavioral: Negative.   Allergic/Immunologic: Negative.       PHYSICAL EXAM:  Vitals:    09/13/18 1305   BP: (!) 141/97   Pulse: (!) 115     Ht Readings from Last 1 Encounters:   09/13/18 4' 11" (1.499 m)     Wt Readings from Last 1 Encounters:   09/13/18 101.1 kg (222 lb 14.2 oz)         GENERAL: Well developed, well nourished, no acute distress. Morbidly obese.  SKIN: Skin is intact. No atrophy, abrasions or lesions are noted.   Neurological: Normal mental status. Appropriate and conversant. Alert and oriented x 3.  GAIT: Walks with mildly antalgic gait.    Right lower extremity compared with LLE:  2+ dorsalis pedis pulse.  Capillary refill < 3 seconds.  Normal range of motion tibiotalar and subtalar joints. Normal alignment of the forefoot and the hindfoot.  5/5 strength EHL, FHL, tibialis anterior, gastrocsoleus, tibialis posterior and peroneals. Sensation to light touch intact sural, saphenous, superficial peroneal and deep peroneal nerves. Mild " swelling of the ankle, no ecchymosis or deformity. No lymphadenopathy, no masses or tumors palpated.  3+ anterior drawer test.  tenderness to palpation at the ATFL and lateral ankle joint.       XRAYS:   3 views of right ankle obtained and reviewed today reveal No evidence of fractures or dislocations. Mild osteoarthritis of the ankle.       ASSESSMENT:        Encounter Diagnosis   Name Primary?    Ankle instability, right         PLAN:  I spent 25 minutes in consulation with the patient today. More than half the time was spent counseling the patient on hercondition and the options for operative versus non-operative care.  I recommend PT for 2 x per week for 6 weeks. F/u 6 weeks if no improvement will order MRI and discuss possible surgical treatment.

## 2018-09-14 NOTE — TELEPHONE ENCOUNTER
----- Message from Ivett Cordova sent at 9/14/2018  3:39 PM CDT -----  Contact: Selff  Patient states 2 of her medications did not get to the pharmacy    gabapentin (NEURONTIN) 300 MG capsule   lidocaine 5 % Gel    Please call back to advise [phone]

## 2018-09-14 NOTE — TELEPHONE ENCOUNTER
Spoke to patient and advised she had some refills on the gabapentin and the lidocaine was sent to the pharmacy but she can get this over the counter.

## 2018-09-17 ENCOUNTER — TELEPHONE (OUTPATIENT)
Dept: ORTHOPEDICS | Facility: CLINIC | Age: 43
End: 2018-09-17

## 2018-09-17 NOTE — TELEPHONE ENCOUNTER
----- Message from Marysol Walters sent at 9/17/2018  3:29 PM CDT -----  Contact: Self  Patient left a voice mail message today at 2:00 that she has had several falls since her last visit and is concerned she may break her ankle.  Please call patient.

## 2018-09-17 NOTE — TELEPHONE ENCOUNTER
Called patient about her recent falls. Pt does not feel it necessary to come in for appointment at this time. Pt will be seen at 6 wk follow up. Patient told to call if there are any further questions or concerns.

## 2018-10-23 ENCOUNTER — OFFICE VISIT (OUTPATIENT)
Dept: ORTHOPEDICS | Facility: CLINIC | Age: 43
End: 2018-10-23
Payer: MEDICARE

## 2018-10-23 ENCOUNTER — PATIENT MESSAGE (OUTPATIENT)
Dept: PAIN MEDICINE | Facility: CLINIC | Age: 43
End: 2018-10-23

## 2018-10-23 ENCOUNTER — HOSPITAL ENCOUNTER (OUTPATIENT)
Dept: RADIOLOGY | Facility: HOSPITAL | Age: 43
Discharge: HOME | End: 2018-10-23
Attending: ANESTHESIOLOGY
Payer: MEDICARE

## 2018-10-23 ENCOUNTER — OFFICE VISIT (OUTPATIENT)
Dept: PAIN MEDICINE | Facility: CLINIC | Age: 43
End: 2018-10-23
Payer: MEDICARE

## 2018-10-23 VITALS
BODY MASS INDEX: 44.53 KG/M2 | WEIGHT: 220.88 LBS | SYSTOLIC BLOOD PRESSURE: 131 MMHG | HEART RATE: 102 BPM | HEIGHT: 59 IN | DIASTOLIC BLOOD PRESSURE: 83 MMHG

## 2018-10-23 VITALS
WEIGHT: 222.88 LBS | DIASTOLIC BLOOD PRESSURE: 87 MMHG | HEART RATE: 95 BPM | BODY MASS INDEX: 44.93 KG/M2 | HEIGHT: 59 IN | SYSTOLIC BLOOD PRESSURE: 122 MMHG

## 2018-10-23 DIAGNOSIS — M25.512 ACUTE PAIN OF LEFT SHOULDER: ICD-10-CM

## 2018-10-23 DIAGNOSIS — M25.371 ANKLE INSTABILITY, RIGHT: Primary | ICD-10-CM

## 2018-10-23 DIAGNOSIS — G89.0 CENTRAL PAIN SYNDROME: Primary | ICD-10-CM

## 2018-10-23 PROCEDURE — 73030 X-RAY EXAM OF SHOULDER: CPT | Mod: 26,LT,, | Performed by: RADIOLOGY

## 2018-10-23 PROCEDURE — 99213 OFFICE O/P EST LOW 20 MIN: CPT | Mod: PBBFAC,27,PN,25 | Performed by: ANESTHESIOLOGY

## 2018-10-23 PROCEDURE — 99999 PR PBB SHADOW E&M-EST. PATIENT-LVL III: CPT | Mod: PBBFAC,,, | Performed by: ORTHOPAEDIC SURGERY

## 2018-10-23 PROCEDURE — 99213 OFFICE O/P EST LOW 20 MIN: CPT | Mod: S$PBB,,, | Performed by: ANESTHESIOLOGY

## 2018-10-23 PROCEDURE — 99999 PR PBB SHADOW E&M-EST. PATIENT-LVL III: CPT | Mod: PBBFAC,,, | Performed by: ANESTHESIOLOGY

## 2018-10-23 PROCEDURE — 99213 OFFICE O/P EST LOW 20 MIN: CPT | Mod: PBBFAC,PN,25 | Performed by: ORTHOPAEDIC SURGERY

## 2018-10-23 PROCEDURE — 73030 X-RAY EXAM OF SHOULDER: CPT | Mod: TC,PO,LT

## 2018-10-23 PROCEDURE — 99214 OFFICE O/P EST MOD 30 MIN: CPT | Mod: S$PBB,,, | Performed by: ORTHOPAEDIC SURGERY

## 2018-10-23 RX ORDER — RUFINAMIDE 400 MG/1
400 TABLET, FILM COATED ORAL 2 TIMES DAILY WITH MEALS
COMMUNITY
Start: 2018-10-17

## 2018-10-23 RX ORDER — MYCOPHENOLATE MOFETIL 500 MG/1
500 TABLET ORAL 2 TIMES DAILY
COMMUNITY
Start: 2018-04-09 | End: 2018-10-23 | Stop reason: SDUPTHER

## 2018-10-23 RX ORDER — PROMETHAZINE HYDROCHLORIDE 25 MG/1
25 TABLET ORAL EVERY 6 HOURS PRN
COMMUNITY
Start: 2018-10-17

## 2018-10-23 RX ORDER — EVEROLIMUS 0.5 MG/1
1 TABLET ORAL 2 TIMES DAILY
COMMUNITY

## 2018-10-23 RX ORDER — TIZANIDINE 4 MG/1
4 TABLET ORAL EVERY 6 HOURS PRN
COMMUNITY
End: 2018-12-20 | Stop reason: SDUPTHER

## 2018-10-23 RX ORDER — LEVOTHYROXINE SODIUM 137 UG/1
175 CAPSULE ORAL DAILY
COMMUNITY

## 2018-10-23 RX ORDER — GABAPENTIN 300 MG/1
300 CAPSULE ORAL 3 TIMES DAILY
COMMUNITY
End: 2019-02-28

## 2018-10-23 NOTE — PROGRESS NOTES
"HPI: Beth Delgado is a 43 y.o. female who was referred to me by Dr. Malik and was seen for f/u  today for right ankle pain. She says she rolled the ankle in March 2017. She says it was very swollen a bruised at the time. She says it gives out now about twice a week. She rates her pain as 7/10 today and the pain is worse with walking and standing. She has h/o kidney transplant in 2013 due to hereditary kidney disease. She says she fell and rolled her ankle again so she did not do PT as instructed. She says she called and the nurse told her not to do PT and I do not see any documentation reflecting this.       PAST MEDICAL/SURGICAL/FAMILY/SOCIAL/ HISTORY: REVIEWED    ALLERGIES/MEDICATIONS: REVIEWED       Review of Systems:     Constitution: Negative.   HEENT: Negative.   Eyes: Negative.   Cardiovascular: Negative.   Respiratory: Negative.   Endocrine: Negative.   Hematologic/Lymphatic: Negative.   Skin: Negative.   Musculoskeletal: Positive for right ankle pain   Gastrointestinal: Negative.   Genitourinary: Negative.   Neurological: Negative.   Psychiatric/Behavioral: Negative.   Allergic/Immunologic: Negative.       PHYSICAL EXAM:  Vitals:    10/23/18 0914   BP: 122/87   Pulse: 95     Ht Readings from Last 1 Encounters:   10/23/18 4' 11" (1.499 m)     Wt Readings from Last 1 Encounters:   10/23/18 101.1 kg (222 lb 14.2 oz)         GENERAL: Well developed, well nourished, no acute distress. Morbidly obese.  SKIN: Skin is intact. No atrophy, abrasions or lesions are noted.   Neurological: Normal mental status. Appropriate and conversant. Alert and oriented x 3.    Right lower extremity:  2+ dorsalis pedis pulse.  Capillary refill < 3 seconds.  Normal range of motion tibiotalar and subtalar joints. Normal alignment of the forefoot and the hindfoot.  5/5 strength EHL, FHL, tibialis anterior, gastrocsoleus, tibialis posterior and peroneals. Sensation to light touch intact sural, saphenous, superficial peroneal and " deep peroneal nerves. Mild swelling of the ankle, no ecchymosis or deformity. No lymphadenopathy, no masses or tumors palpated.  3+ anterior drawer test.  tenderness to palpation at the ATFL and lateral ankle joint.       XRAYS:   3 views of right ankle  reviewed today reveal No evidence of fractures or dislocations. Mild osteoarthritis of the ankle.       ASSESSMENT:        Encounter Diagnosis   Name Primary?    Ankle instability, right Yes         PLAN:  I discussed with the patient again today that I recommend dedicated PT for the ankle prior to any surgical treatment in order to improved the strength of the ankle and her gait.  I recommend PT for 2 x per week for 6 weeks. She proceeded to argue with me saying I told her PT would not work which is not the case. I told her that if she failed to improve with 6 weeks of PT twice a week then I would obtain an MRI and discuss surgical treatment with her. She continued to argue with me about it and I told her to read her clinic note from her previous visit.   She continued to argue with me so I told her that she could do the dedicated PT for the ankle for 6 weeks as prescribed and then f/u with otherwise there would be no reason for her to follow up.

## 2018-10-23 NOTE — Clinical Note
I tried to help her but she is non-compliant. If she completes PT I will see her back if she has not improved.

## 2018-10-23 NOTE — PROGRESS NOTES
This note was completed with dictation software and grammatical errors may exist.    CC: Pain in multiple locations    HPI: The patient is a 43-year-old woman with a history of tuberous sclerosis, kidney transplant, seizure disorder and chronic nerve pain who presents in self-referral.  She returns in follow-up today for multiple locations of pain. She describes pain throughout her entire body, burning, states that it keeps her awake at times.  Since I had last seen her she did increase the gabapentin from 300 3 times daily up to 300 twice daily with 600 mg at night.  She denies any benefit from this, denies any drowsiness from it either.  She continues to take Zanaflex at night with some benefit.  She has seen Dr. Davis who recommended physical therapy for her right ankle but she never attended PT.  She was at the visit today and apparently after the visit she was walking out of the room and fell onto the ground on her left shoulder and left hip.  She states that she has some discomfort in the left hip and pain in the left shoulder.  The pain is worse with trying to raise the arm, move the arm.  She denies any numbness or tingling in the arm, no neck pain.    Pain intervention history: She has previously seen pain management physicians in the past and it looks like she was taking oxycodone and OxyContin.  She may have taken some low-dose gabapentin in the past, unclear relief.  She did not feel that this was all that helpful.  She has been taking Zanaflex with some relief.  She uses Biofreeze on her legs and says this helps the most.  Unclear if she has done physical therapy recently.  She apparently had an EMG performed and was told that she had some nerve damage of some sort.      ROS: She reports fatigability, weight gain, itching, headaches, head trauma, appetite change, diarrhea, stomach pain, nausea, easy bruising, joint pain, urgency, frequency, painful urination, joint stiffness, joint swelling, back  "pain, seizures, dizziness, difficulty sleeping and loss of balance.  Balance of review of systems is negative.    Past Medical History:   Diagnosis Date    Seizures     Tuberous sclerosis        Past Surgical History:   Procedure Laterality Date    KIDNEY TRANSPLANT         Social History     Socioeconomic History    Marital status:      Spouse name: None    Number of children: None    Years of education: None    Highest education level: None   Social Needs    Financial resource strain: None    Food insecurity - worry: None    Food insecurity - inability: None    Transportation needs - medical: None    Transportation needs - non-medical: None   Occupational History    None   Tobacco Use    Smoking status: Never Smoker    Smokeless tobacco: Never Used   Substance and Sexual Activity    Alcohol use: No    Drug use: No    Sexual activity: No     Partners: Male   Other Topics Concern    None   Social History Narrative    None         Medications/Allergies: See med card    Vitals:    10/23/18 1101   BP: 131/83   Pulse: 102   Weight: 100.2 kg (220 lb 14.4 oz)   Height: 4' 11" (1.499 m)   PainSc:   7         Physical exam:    Gen: A and O x3, pleasant, well-groomed, morbidly obese  Skin: No rashes or obvious lesions  HEENT: PERRLA, no obvious deformities on ears or in canals. Trachea midline.  CVS: Regular rate and rhythm, normal palpable pulses.  Resp:No increased work of breathing, symmetrical chest rise.  Abdomen: Soft, NT/ND.  Musculoskeletal: Slow, wide-based gait.     Neuro:  Lower extremities: 5/5 strength bilaterally  Reflexes: Patellar 1+, Achilles 1+ bilaterally.  Sensory:  Intact and symmetrical to light touch and pinprick in L2-S1 dermatomes bilaterally.    Lumbar spine:  Lumbar spine: Range of motion is moderately reduced with both flexion and extension with increased pain on both maneuvers.  Vinny's test causes no increased pain on either side.    Supine straight leg raise is " negative bilaterally.    Internal and external rotation of the hip causes no increase  Myofascial exam: Tenderness to palpation across lumbar paraspinous muscles, cervical paraspinous region, thoracic paraspinous region..  There is some tenderness to palpation across the left lateral hip.    Right ankle, no major deformities noted but does have pain with passive range of motion of the joint.  Passive range of motion with bilateral knees causes discomfort.  No swelling or erythema about the joints.  Left shoulder exam:  No bony deformity noted, there is some tenderness to palpation over the left deltoid and anterior shoulder, no bruising or ecchymoses or lacerations throughout the left arm.  Strength is equal in bilateral arms, sensation intact bilaterally.  There is discomfort elicited with range of motion above 70°, pain with internal external rotation.        Imagin18 Xray  Cervical spine:  Sclerosis is noted involving the vertebral bodies which appear well aligned.  Intervertebral disc heights appear well preserved.  No obvious fracture or dislocation is noted.  Osseous demineralization may be questioned.  The atlas and odontoid appear in good relationship to each other.  The osseous neural foramina appear relatively well preserved.  Lumbar spine:  Surgical clips and postsurgical lines are noted involving the abdomen.  Sclerosis is noted that appears to be involving the vertebral bodies.  Vertebral body alignment appears adequate.  Vertebral body heights appear well preserved.  Intervertebral disc heights appear well preserved.    18 Xray right ankle: There is a small ossific density present adjacent to the tip of the medial malleolus compatible with an age-indeterminate medial malleolar avulsion injury, possibly remote.  Please correlate clinically.  There is Achilles insertion calcaneal enthesophyte formation.  There is a small plantar calcaneal spur.  The ankle mortise width is symmetric.  No  talar dome osteochondral lesion.  No soft tissue abnormality.    7/18/18 Bilateral knee xray:  No radiographically evident acute fracture, dislocation, or osseous destructive process.  There is mild left knee medial tibiofemoral joint space narrowing with AP standing positioning of the knees.  The patient is not optimally positioned for evaluation of the patellofemoral joint compartment as a result of limited patient ability/cooperation (per the performing technologist).  No chondrocalcinosis.  No left or right knee joint effusion.    Assessment:   The patient is a 43-year-old woman with a history of tuberous sclerosis, kidney transplant, seizure disorder and chronic nerve pain who presents in self-referral.    1. Central pain syndrome     2. Acute pain of left shoulder  X-Ray Shoulder 2 or More Views Left         Plan:   1.  For her left shoulder pain, I think she likely just has some soft tissue injury, I will get an x-ray to make sure there is no fractures but her pain seems to be minimal at this point.  I will call her with the results.  I told her to ice this up.  2.  As far as her central pain syndrome, we are going to continue increasing gabapentin up to 600 mg 3 times a day for the next month.  If she does not have benefit with this I would like her to contact me and I do not think that further increases would be recommended.  I would switch her to a different medication such as Lyrica.  I will have her follow up in 2 months or sooner as needed.

## 2018-10-23 NOTE — TELEPHONE ENCOUNTER
Please let the patient know that her shoulder looks fine, continue icing it several times daily, call us if this worsens.

## 2018-12-10 ENCOUNTER — TELEPHONE (OUTPATIENT)
Dept: PAIN MEDICINE | Facility: CLINIC | Age: 43
End: 2018-12-10

## 2018-12-10 NOTE — TELEPHONE ENCOUNTER
----- Message from Tania Sterling sent at 12/10/2018  4:05 PM CST -----  Contact: self  Patient need to speak to nurse regarding to discuss physical therapy ,far as her pain level is going up per patient     Patient states she has fell since last visit       Patient refused to schedule appt until she speak to nurse for recommendations      Please call to advice [phone] (home)

## 2018-12-11 NOTE — TELEPHONE ENCOUNTER
Patient stated she feels like the physical therapy is not helping her ankle pain. She stated the pain keeps her up at night. Please advise what are her options.

## 2018-12-12 NOTE — TELEPHONE ENCOUNTER
Patient will come in to appointment on 12/31 and discuss her ankle problems. She does not want to return to [place].

## 2018-12-20 RX ORDER — TIZANIDINE 4 MG/1
4 TABLET ORAL NIGHTLY PRN
Qty: 30 TABLET | Refills: 2 | Status: SHIPPED | OUTPATIENT
Start: 2018-12-20 | End: 2019-02-28

## 2018-12-31 ENCOUNTER — OFFICE VISIT (OUTPATIENT)
Dept: PAIN MEDICINE | Facility: CLINIC | Age: 43
End: 2018-12-31
Payer: MEDICARE

## 2018-12-31 ENCOUNTER — HOSPITAL ENCOUNTER (OUTPATIENT)
Dept: RADIOLOGY | Facility: HOSPITAL | Age: 43
Discharge: HOME | End: 2018-12-31
Attending: ANESTHESIOLOGY
Payer: MEDICARE

## 2018-12-31 VITALS
TEMPERATURE: 99 F | SYSTOLIC BLOOD PRESSURE: 136 MMHG | RESPIRATION RATE: 20 BRPM | HEART RATE: 114 BPM | BODY MASS INDEX: 43.48 KG/M2 | WEIGHT: 215.25 LBS | DIASTOLIC BLOOD PRESSURE: 93 MMHG

## 2018-12-31 DIAGNOSIS — Q85.1 TUBEROUS SCLEROSIS: ICD-10-CM

## 2018-12-31 DIAGNOSIS — G89.29 CHRONIC PAIN OF BOTH KNEES: ICD-10-CM

## 2018-12-31 DIAGNOSIS — M25.571 CHRONIC PAIN OF RIGHT ANKLE: Primary | ICD-10-CM

## 2018-12-31 DIAGNOSIS — M25.562 CHRONIC PAIN OF BOTH KNEES: ICD-10-CM

## 2018-12-31 DIAGNOSIS — M25.561 CHRONIC PAIN OF BOTH KNEES: ICD-10-CM

## 2018-12-31 DIAGNOSIS — G89.0 CENTRAL PAIN SYNDROME: ICD-10-CM

## 2018-12-31 DIAGNOSIS — G89.29 CHRONIC PAIN OF RIGHT ANKLE: Primary | ICD-10-CM

## 2018-12-31 PROCEDURE — 99215 OFFICE O/P EST HI 40 MIN: CPT | Mod: PBBFAC,25,PN | Performed by: ANESTHESIOLOGY

## 2018-12-31 PROCEDURE — 73562 XR KNEE 3 VIEW LEFT: ICD-10-PCS | Mod: 26,LT,, | Performed by: RADIOLOGY

## 2018-12-31 PROCEDURE — 73562 X-RAY EXAM OF KNEE 3: CPT | Mod: 26,LT,, | Performed by: RADIOLOGY

## 2018-12-31 PROCEDURE — 99213 OFFICE O/P EST LOW 20 MIN: CPT | Mod: S$PBB,,, | Performed by: ANESTHESIOLOGY

## 2018-12-31 PROCEDURE — 73562 X-RAY EXAM OF KNEE 3: CPT | Mod: TC,PO,LT

## 2018-12-31 PROCEDURE — 99999 PR PBB SHADOW E&M-EST. PATIENT-LVL V: CPT | Mod: PBBFAC,,, | Performed by: ANESTHESIOLOGY

## 2018-12-31 RX ORDER — PREGABALIN 50 MG/1
50 CAPSULE ORAL 2 TIMES DAILY
Qty: 60 CAPSULE | Refills: 2 | Status: SHIPPED | OUTPATIENT
Start: 2018-12-31 | End: 2019-02-28 | Stop reason: SDUPTHER

## 2018-12-31 NOTE — PROGRESS NOTES
This note was completed with dictation software and grammatical errors may exist.    CC: Pain in multiple locations    HPI: The patient is a 43-year-old woman with a history of tuberous sclerosis, kidney transplant, seizure disorder and chronic nerve pain who presents in self-referral.  She returns in follow-up today for multiple pain complaints, main complaint today is right ankle pain and left knee pain. States since the last visit, she was continuing to take gabapentin but states that it has not helped.  She states that she has now done physical therapy 2-3 days a week for four weeks as instructed by Dr. Davis and states that she was discharged because she was not improving.  She states that she has continued to fall but especially when she is not wearing her ankle brace, which she does when she takes shower.  She states that she recently twisted her left knee and this has caused increased pain, difficulty with walking.  She also complains of burning and tingling pain throughout her right foot.  She states that the pain throughout her entire body has continued, no major relief with gabapentin, Zanaflex does help.      Pain intervention history: She has previously seen pain management physicians in the past and it looks like she was taking oxycodone and OxyContin.  She may have taken some low-dose gabapentin in the past, unclear relief.  She did not feel that this was all that helpful.  She has been taking Zanaflex with some relief.  She uses Biofreeze on her legs and says this helps the most.  Unclear if she has done physical therapy recently.  She apparently had an EMG performed and was told that she had some nerve damage of some sort.      ROS: She reports fatigability, weight gain, itching, headaches, head trauma, appetite change, diarrhea, stomach pain, nausea, easy bruising, joint pain, urgency, frequency, painful urination, joint stiffness, joint swelling, back pain, seizures, dizziness, difficulty  sleeping and loss of balance.  Balance of review of systems is negative.    Past Medical History:   Diagnosis Date    Seizures     Tuberous sclerosis        Past Surgical History:   Procedure Laterality Date    KIDNEY TRANSPLANT         Social History     Socioeconomic History    Marital status:      Spouse name: None    Number of children: None    Years of education: None    Highest education level: None   Social Needs    Financial resource strain: None    Food insecurity - worry: None    Food insecurity - inability: None    Transportation needs - medical: None    Transportation needs - non-medical: None   Occupational History    None   Tobacco Use    Smoking status: Never Smoker    Smokeless tobacco: Never Used   Substance and Sexual Activity    Alcohol use: No    Drug use: No    Sexual activity: No     Partners: Male   Other Topics Concern    None   Social History Narrative    None         Medications/Allergies: See med card    Vitals:    12/31/18 1106   BP: (!) 136/93   Pulse: (!) 114   Resp: 20   Temp: 98.5 °F (36.9 °C)   TempSrc: Oral   Weight: 97.7 kg (215 lb 4.5 oz)   PainSc:   4   PainLoc: Back         Physical exam:    Gen: A and O x3, pleasant, well-groomed, morbidly obese  Skin: No rashes or obvious lesions  HEENT: PERRLA, no obvious deformities on ears or in canals. Trachea midline.  CVS: Regular rate and rhythm, normal palpable pulses.  Resp:No increased work of breathing, symmetrical chest rise.  Abdomen: Soft, NT/ND.  Musculoskeletal: Slow, wide-based gait.     Neuro:  Lower extremities: 5/5 strength bilaterally  Reflexes: Patellar 1+, Achilles 1+ bilaterally.  Sensory:  Intact and symmetrical to light touch and pinprick in L2-S1 dermatomes bilaterally.    Lumbar spine:  Lumbar spine: Range of motion is moderately reduced with both flexion and extension with increased pain on both maneuvers.  Vinny's test causes no increased pain on either side.    Supine straight leg  raise is negative bilaterally.    Internal and external rotation of the hip causes no increase  Myofascial exam: Tenderness to palpation across lumbar paraspinous muscles, cervical paraspinous region, thoracic paraspinous region..  There is some tenderness to palpation across the left lateral hip.    Right ankle, no major deformities noted but does have pain with passive range of motion of the joint.  Passive range of motion with bilateral knees causes discomfort.  No swelling or erythema about the joints.  Left shoulder exam:  No bony deformity noted, there is some tenderness to palpation over the left deltoid and anterior shoulder, no bruising or ecchymoses or lacerations throughout the left arm.  Strength is equal in bilateral arms, sensation intact bilaterally.  There is discomfort elicited with range of motion above 70°, pain with internal external rotation.        Imagin18 Xray  Cervical spine:  Sclerosis is noted involving the vertebral bodies which appear well aligned.  Intervertebral disc heights appear well preserved.  No obvious fracture or dislocation is noted.  Osseous demineralization may be questioned.  The atlas and odontoid appear in good relationship to each other.  The osseous neural foramina appear relatively well preserved.  Lumbar spine:  Surgical clips and postsurgical lines are noted involving the abdomen.  Sclerosis is noted that appears to be involving the vertebral bodies.  Vertebral body alignment appears adequate.  Vertebral body heights appear well preserved.  Intervertebral disc heights appear well preserved.    18 Xray right ankle: There is a small ossific density present adjacent to the tip of the medial malleolus compatible with an age-indeterminate medial malleolar avulsion injury, possibly remote.  Please correlate clinically.  There is Achilles insertion calcaneal enthesophyte formation.  There is a small plantar calcaneal spur.  The ankle mortise width is  symmetric.  No talar dome osteochondral lesion.  No soft tissue abnormality.    7/18/18 Bilateral knee xray:  No radiographically evident acute fracture, dislocation, or osseous destructive process.  There is mild left knee medial tibiofemoral joint space narrowing with AP standing positioning of the knees.  The patient is not optimally positioned for evaluation of the patellofemoral joint compartment as a result of limited patient ability/cooperation (per the performing technologist).  No chondrocalcinosis.  No left or right knee joint effusion.    Assessment:   The patient is a 43-year-old woman with a history of tuberous sclerosis, kidney transplant, seizure disorder and chronic nerve pain who presents in self-referral.    1. Chronic pain of right ankle  Ambulatory referral to Orthopedics   2. Central pain syndrome     3. Tuberous sclerosis     4. Chronic pain of both knees           Plan:  1.  For her right ankle pain, I explained that I do not really have anything to offer her, she had seen Dr. Davis but does not want to follow-up with her.  I will send her to one of our other ankle and foot specialists.  2.  For her left knee pain, I am going to get an x-ray and call her with results.  We may consider injections for this and I would have her see orthopedics.  3.  For her pain throughout her entire body which seems to be a central pain syndrome, the gabapentin did not seem to be helpful so we are going to try Lyrica.  I am going to give her prescription for 50 mg to start at night and then increase to twice daily.  We will continue increasing this as needed and tolerated.  Follow-up in three months.

## 2019-01-02 ENCOUNTER — TELEPHONE (OUTPATIENT)
Dept: PAIN MEDICINE | Facility: CLINIC | Age: 44
End: 2019-01-02

## 2019-01-02 NOTE — TELEPHONE ENCOUNTER
Please let the patient know that I have reviewed her left knee x-ray and she definitely has arthritis, not necessarily severe but likely causing her symptoms.  My recommendation would be to give this a little bit more time to improve, would ice it several times daily.  If she is not getting improvement, we can set her up with a steroid injection.

## 2019-01-04 ENCOUNTER — TELEPHONE (OUTPATIENT)
Dept: PAIN MEDICINE | Facility: CLINIC | Age: 44
End: 2019-01-04

## 2019-01-04 NOTE — TELEPHONE ENCOUNTER
----- Message from Isabela Cortes sent at 1/4/2019 10:25 AM CST -----  Contact: self   Patient want to speak with a nurse regarding test results please call back at [phone] (home)

## 2019-01-04 NOTE — TELEPHONE ENCOUNTER
Spoke to patient about xray results and she will ice her knee and she will let us know if it doesn't feel any better.

## 2019-01-09 ENCOUNTER — TELEPHONE (OUTPATIENT)
Dept: PAIN MEDICINE | Facility: CLINIC | Age: 44
End: 2019-01-09

## 2019-01-09 NOTE — TELEPHONE ENCOUNTER
----- Message from Gabby Rubin sent at 1/9/2019  4:24 PM CST -----  Contact: Self  Patient is returning a call from [facility] but she doesn't know who she is or with what doctor. Please call back to advise at [phone] (home).  Thanks

## 2019-01-24 ENCOUNTER — TELEPHONE (OUTPATIENT)
Dept: ADMINISTRATIVE | Facility: OTHER | Age: 44
End: 2019-01-24

## 2019-01-25 NOTE — TELEPHONE ENCOUNTER
Patient requesting records to be faxed to orthopedic. She states that her knee is somewhat improved but still hurting with movement. Patient reports that her feet and hands go numb/tingling feeling. Patient started lyrica at he end of December.

## 2019-01-28 NOTE — TELEPHONE ENCOUNTER
Okay, she can request records that is not something we handle, that comes from records.  To she feel like the Lyrica is helping?

## 2019-02-11 ENCOUNTER — TELEPHONE (OUTPATIENT)
Dept: ADMINISTRATIVE | Facility: OTHER | Age: 44
End: 2019-02-11

## 2019-02-12 NOTE — TELEPHONE ENCOUNTER
----- Message from Arelis Mohr sent at 2/11/2019  4:33 PM CST -----  Contact: self [phone]  Please call her regarding her pain level.  Thank you!

## 2019-02-28 ENCOUNTER — OFFICE VISIT (OUTPATIENT)
Dept: PAIN MEDICINE | Facility: CLINIC | Age: 44
End: 2019-02-28
Payer: MEDICARE

## 2019-02-28 VITALS
DIASTOLIC BLOOD PRESSURE: 77 MMHG | OXYGEN SATURATION: 95 % | TEMPERATURE: 97 F | SYSTOLIC BLOOD PRESSURE: 141 MMHG | BODY MASS INDEX: 46.31 KG/M2 | HEART RATE: 100 BPM | WEIGHT: 229.25 LBS | RESPIRATION RATE: 20 BRPM

## 2019-02-28 DIAGNOSIS — M25.571 CHRONIC PAIN OF RIGHT ANKLE: Primary | ICD-10-CM

## 2019-02-28 DIAGNOSIS — G89.0 CENTRAL PAIN SYNDROME: ICD-10-CM

## 2019-02-28 DIAGNOSIS — G89.29 CHRONIC PAIN OF RIGHT ANKLE: Primary | ICD-10-CM

## 2019-02-28 PROCEDURE — 99999 PR PBB SHADOW E&M-EST. PATIENT-LVL V: CPT | Mod: PBBFAC,,, | Performed by: PHYSICIAN ASSISTANT

## 2019-02-28 PROCEDURE — 99999 PR PBB SHADOW E&M-EST. PATIENT-LVL V: ICD-10-PCS | Mod: PBBFAC,,, | Performed by: PHYSICIAN ASSISTANT

## 2019-02-28 PROCEDURE — 99215 OFFICE O/P EST HI 40 MIN: CPT | Mod: S$PBB,,, | Performed by: PHYSICIAN ASSISTANT

## 2019-02-28 PROCEDURE — 99215 PR OFFICE/OUTPT VISIT, EST, LEVL V, 40-54 MIN: ICD-10-PCS | Mod: S$PBB,,, | Performed by: PHYSICIAN ASSISTANT

## 2019-02-28 PROCEDURE — 99215 OFFICE O/P EST HI 40 MIN: CPT | Mod: PBBFAC,PN | Performed by: PHYSICIAN ASSISTANT

## 2019-02-28 RX ORDER — LIDOCAINE HYDROCHLORIDE 20 MG/ML
JELLY TOPICAL 2 TIMES DAILY PRN
Qty: 30 ML | Refills: 5 | Status: SHIPPED | OUTPATIENT
Start: 2019-02-28 | End: 2019-05-21 | Stop reason: SDUPTHER

## 2019-02-28 RX ORDER — PREGABALIN 50 MG/1
50 CAPSULE ORAL 3 TIMES DAILY
Qty: 90 CAPSULE | Refills: 2 | Status: SHIPPED | OUTPATIENT
Start: 2019-02-28 | End: 2019-05-21

## 2019-02-28 RX ORDER — TIZANIDINE 4 MG/1
4 TABLET ORAL 2 TIMES DAILY PRN
Qty: 60 TABLET | Refills: 2 | Status: SHIPPED | OUTPATIENT
Start: 2019-02-28 | End: 2019-05-21 | Stop reason: SDUPTHER

## 2019-03-03 NOTE — PROGRESS NOTES
This note was completed with dictation software and grammatical errors may exist.    CC: Pain in multiple locations    HPI: The patient is a 44-year-old woman with a history of tuberous sclerosis, kidney transplant, seizure disorder and chronic nerve pain who presents in self-referral.  She returns in follow-up today with multiple pain complaints.  The patient is new to me.  She complains of mainly right-sided body pain but she has to main complaints.  She reports bilateral ft pain, burning, numbness that is getting worse and denies any relief with Lyrica.  She also reports right ankle pain over the past 2 years and has now seen to more orthopedic doctors in [place] who are foot and ankle specialists, Dr. Chelsea Kamara and Dr. Gianna Soares.  She states that they both had different suggestions on what she should do with her right ankle so she is not sure what she should be doing.  She is requesting opioids today.    Pain intervention history: She has previously seen pain management physicians in the past and it looks like she was taking oxycodone and OxyContin.  She may have taken some low-dose gabapentin in the past, unclear relief.  She did not feel that this was all that helpful.  She has been taking Zanaflex with some relief.  She uses Biofreeze on her legs and says this helps the most.  Unclear if she has done physical therapy recently.  She apparently had an EMG performed and was told that she had some nerve damage of some sort.      ROS: She reports fatigability, weight gain, itching, headaches, head trauma, appetite change, diarrhea, stomach pain, nausea, easy bruising, joint pain, urgency, frequency, painful urination, joint stiffness, joint swelling, back pain, seizures, dizziness, difficulty sleeping and loss of balance.  Balance of review of systems is negative.    Past Medical History:   Diagnosis Date    Seizures     Tuberous sclerosis        Past Surgical History:   Procedure Laterality Date     KIDNEY TRANSPLANT         Social History     Socioeconomic History    Marital status:      Spouse name: None    Number of children: None    Years of education: None    Highest education level: None   Social Needs    Financial resource strain: None    Food insecurity - worry: None    Food insecurity - inability: None    Transportation needs - medical: None    Transportation needs - non-medical: None   Occupational History    None   Tobacco Use    Smoking status: Never Smoker    Smokeless tobacco: Never Used   Substance and Sexual Activity    Alcohol use: No    Drug use: No    Sexual activity: No     Partners: Male   Other Topics Concern    None   Social History Narrative    None         Medications/Allergies: See med card    Vitals:    02/28/19 1140   BP: (!) 141/77   Pulse: 100   Resp: 20   Temp: 96.6 °F (35.9 °C)   TempSrc: Oral   SpO2: 95%   Weight: 104 kg (229 lb 4.5 oz)   PainSc:   8   PainLoc: Foot         Physical exam:  Gen: A and O x3, pleasant, well-groomed, morbidly obese  Skin: No rashes or obvious lesions  HEENT: PERRLA, no obvious deformities on ears or in canals. Trachea midline.  CVS: Regular rate and rhythm, normal palpable pulses.  Resp:No increased work of breathing, symmetrical chest rise.  Abdomen: Soft, NT/ND.  Musculoskeletal: Slow, wide-based gait.  Ambulating with a cane.    Neuro:  Lower extremities: 5/5 strength bilaterally  Reflexes: Patellar 1+, Achilles 1+ bilaterally.  Sensory:  Intact and symmetrical to light touch and pinprick in L2-S1 dermatomes bilaterally.    Lumbar spine:  Lumbar spine: Range of motion is moderately reduced with both flexion and extension with increased pain on both maneuvers.  Vinny's test causes no increased pain on either side.    Supine straight leg raise is negative bilaterally.    Internal and external rotation of the hip causes no increase  Myofascial exam: Tenderness to palpation across lumbar paraspinous muscles, cervical  paraspinous region, thoracic paraspinous region..  There is some tenderness to palpation across the left lateral hip.    Right ankle, no major deformities noted but does have pain with passive range of motion of the joint.  Passive range of motion with bilateral knees causes discomfort.  No swelling or erythema about the joints.    Left shoulder exam:  No bony deformity noted, there is some tenderness to palpation over the left deltoid and anterior shoulder, no bruising or ecchymoses or lacerations throughout the left arm.  Strength is equal in bilateral arms, sensation intact bilaterally.  There is discomfort elicited with range of motion above 70°, pain with internal external rotation.      Imagin18 Xray  Cervical spine:  Sclerosis is noted involving the vertebral bodies which appear well aligned.  Intervertebral disc heights appear well preserved.  No obvious fracture or dislocation is noted.  Osseous demineralization may be questioned.  The atlas and odontoid appear in good relationship to each other.  The osseous neural foramina appear relatively well preserved.  Lumbar spine:  Surgical clips and postsurgical lines are noted involving the abdomen.  Sclerosis is noted that appears to be involving the vertebral bodies.  Vertebral body alignment appears adequate.  Vertebral body heights appear well preserved.  Intervertebral disc heights appear well preserved.    18 Xray right ankle: There is a small ossific density present adjacent to the tip of the medial malleolus compatible with an age-indeterminate medial malleolar avulsion injury, possibly remote.  Please correlate clinically.  There is Achilles insertion calcaneal enthesophyte formation.  There is a small plantar calcaneal spur.  The ankle mortise width is symmetric.  No talar dome osteochondral lesion.  No soft tissue abnormality.    18 Bilateral knee xray:  No radiographically evident acute fracture, dislocation, or osseous destructive  process.  There is mild left knee medial tibiofemoral joint space narrowing with AP standing positioning of the knees.  The patient is not optimally positioned for evaluation of the patellofemoral joint compartment as a result of limited patient ability/cooperation (per the performing technologist).  No chondrocalcinosis.  No left or right knee joint effusion.    Assessment:   The patient is a 44-year-old woman with a history of tuberous sclerosis, kidney transplant, seizure disorder and chronic nerve pain who presents in self-referral.    1. Chronic pain of right ankle  Ambulatory referral to Orthopedics   2. Central pain syndrome           Plan:  1.  She is requesting opioids today and I have reviewed this with Dr. Malik.  We do not recommend narcotics.  She specifically requested for Tylenol 3 or hydrocodone.  Dr. Malik provided a prescription for Lyrica 50 mg 3 times a day, increasing from twice a day.  I have also refilled her lidocaine jelly 2%.  We we discussed that she cannot take NSAIDs but can take Tylenol.  I have also increased her tizanidine to twice a day as needed for muscle spasms, up from once a day.  2.  In regards to her right ankle pain, she is unhappy with the 3 opinions from foot and ankle specialist that she has received because there are conflicting recommendations.  I explained to her that Dr. Malik myself cannot give her specific recommendations on what to do about her right ankle but I placed a referral to Dr. Linares for further evaluation.  3.  She will follow-up in 3 months or sooner as needed.    Greater than 50% of this 40 min visit was spent on counseling the patient.

## 2019-05-13 ENCOUNTER — TELEPHONE (OUTPATIENT)
Dept: PAIN MEDICINE | Facility: CLINIC | Age: 44
End: 2019-05-13

## 2019-05-13 NOTE — TELEPHONE ENCOUNTER
Spoke with the patient and she will be having ankle surgery after she heals from the hysterectomy. She was advised that her surgeon will monitor her post op pain and they will be the one to prescribe her pain medication during her post op period. After her release Dr. Malik will pick back up on her pain medication at that time. No further questions.

## 2019-05-13 NOTE — TELEPHONE ENCOUNTER
----- Message from Arelis Mohr sent at 5/13/2019  2:12 PM CDT -----  Contact: self [phone]  She is calling back, said she needs to speak to Lillian again about something else.  Please call her.  Thank you!

## 2019-05-13 NOTE — TELEPHONE ENCOUNTER
Received a message from [facility] indicating that the patient was given cipro. Advised not to take cipro with Tizanidine. Spoke with the patient and she has 1 pill left and has not been taking the Tizanidine.

## 2019-05-21 ENCOUNTER — OFFICE VISIT (OUTPATIENT)
Dept: PAIN MEDICINE | Facility: CLINIC | Age: 44
End: 2019-05-21
Payer: MEDICARE

## 2019-05-21 VITALS
HEART RATE: 88 BPM | WEIGHT: 242.31 LBS | HEIGHT: 59 IN | DIASTOLIC BLOOD PRESSURE: 80 MMHG | SYSTOLIC BLOOD PRESSURE: 110 MMHG | BODY MASS INDEX: 48.85 KG/M2

## 2019-05-21 DIAGNOSIS — Q85.1 TUBEROUS SCLEROSIS: ICD-10-CM

## 2019-05-21 DIAGNOSIS — M25.571 CHRONIC PAIN OF RIGHT ANKLE: ICD-10-CM

## 2019-05-21 DIAGNOSIS — G89.29 CHRONIC PAIN OF BOTH KNEES: ICD-10-CM

## 2019-05-21 DIAGNOSIS — G89.29 CHRONIC PAIN OF RIGHT ANKLE: ICD-10-CM

## 2019-05-21 DIAGNOSIS — M62.838 MUSCLE SPASMS OF BOTH LOWER EXTREMITIES: ICD-10-CM

## 2019-05-21 DIAGNOSIS — M25.562 CHRONIC PAIN OF BOTH KNEES: ICD-10-CM

## 2019-05-21 DIAGNOSIS — M25.561 CHRONIC PAIN OF BOTH KNEES: ICD-10-CM

## 2019-05-21 DIAGNOSIS — G89.0 CENTRAL PAIN SYNDROME: Primary | ICD-10-CM

## 2019-05-21 DIAGNOSIS — M54.50 LOW BACK PAIN, NON-SPECIFIC: ICD-10-CM

## 2019-05-21 PROCEDURE — 99215 OFFICE O/P EST HI 40 MIN: CPT | Mod: PBBFAC,PN | Performed by: PHYSICIAN ASSISTANT

## 2019-05-21 PROCEDURE — 99215 OFFICE O/P EST HI 40 MIN: CPT | Mod: S$PBB,,, | Performed by: PHYSICIAN ASSISTANT

## 2019-05-21 PROCEDURE — 99999 PR PBB SHADOW E&M-EST. PATIENT-LVL V: CPT | Mod: PBBFAC,,, | Performed by: PHYSICIAN ASSISTANT

## 2019-05-21 PROCEDURE — 99999 PR PBB SHADOW E&M-EST. PATIENT-LVL V: ICD-10-PCS | Mod: PBBFAC,,, | Performed by: PHYSICIAN ASSISTANT

## 2019-05-21 PROCEDURE — 99215 PR OFFICE/OUTPT VISIT, EST, LEVL V, 40-54 MIN: ICD-10-PCS | Mod: S$PBB,,, | Performed by: PHYSICIAN ASSISTANT

## 2019-05-21 RX ORDER — ASPIRIN 81 MG/1
81 TABLET ORAL
COMMUNITY

## 2019-05-21 RX ORDER — LIDOCAINE HYDROCHLORIDE 20 MG/ML
JELLY TOPICAL 2 TIMES DAILY PRN
Qty: 30 ML | Refills: 5 | Status: SHIPPED | OUTPATIENT
Start: 2019-05-21

## 2019-05-21 RX ORDER — PROPRANOLOL HYDROCHLORIDE 40 MG/1
40 TABLET ORAL
COMMUNITY
Start: 2019-04-15

## 2019-05-21 RX ORDER — BUSPIRONE HYDROCHLORIDE 10 MG/1
TABLET ORAL
COMMUNITY
Start: 2019-03-05

## 2019-05-21 RX ORDER — PREGABALIN 100 MG/1
100 CAPSULE ORAL 3 TIMES DAILY
Qty: 90 CAPSULE | Refills: 2 | Status: SHIPPED | OUTPATIENT
Start: 2019-05-21 | End: 2019-09-03 | Stop reason: SDUPTHER

## 2019-05-21 RX ORDER — TIZANIDINE 4 MG/1
4 TABLET ORAL 2 TIMES DAILY PRN
Qty: 60 TABLET | Refills: 2 | Status: SHIPPED | OUTPATIENT
Start: 2019-05-21

## 2019-05-21 RX ORDER — OXYBUTYNIN CHLORIDE 10 MG/1
10 TABLET, EXTENDED RELEASE ORAL
COMMUNITY
Start: 2019-03-25 | End: 2020-05-06

## 2019-05-21 RX ORDER — MYCOPHENOLATE MOFETIL 500 MG/1
500 TABLET ORAL
COMMUNITY
Start: 2018-04-09

## 2019-05-21 RX ORDER — MULTIVITAMIN
1 TABLET ORAL
COMMUNITY

## 2019-05-21 RX ORDER — QUETIAPINE FUMARATE 300 MG/1
200 TABLET, FILM COATED ORAL 2 TIMES DAILY
COMMUNITY
Start: 2019-03-01

## 2019-05-21 RX ORDER — ZOLMITRIPTAN 5 MG/1
5 TABLET, ORALLY DISINTEGRATING ORAL
COMMUNITY
Start: 2019-04-15 | End: 2020-04-14

## 2019-05-21 NOTE — PROGRESS NOTES
This note was completed with dictation software and grammatical errors may exist.    CC: Pain in multiple locations    HPI: The patient is a 44-year-old woman with a history of tuberous sclerosis, kidney transplant, seizure disorder, bipolar disorder and chronic nerve pain who presents in self-referral.  She returns in follow-up today with multiple pain complaints.  Since her last visit she has seen Dr. Linares and plans on having right ankle surgery.  She continues to have pain throughout the right side of her body, low back pain, bilateral knee pain and severe pain in both feet.  She describes her bilateral foot pain as stinging, throbbing and pulsating.  She denies any relief with Lyrica 50 mg 3 times a day.  She reports about 40% improvement in her muscle tightness and spasms with tizanidine.    Pain intervention history: She has previously seen pain management physicians in the past and it looks like she was taking oxycodone and OxyContin.  She may have taken some low-dose gabapentin in the past, unclear relief.  She did not feel that this was all that helpful.  She has been taking Zanaflex with some relief.  She uses Biofreeze on her legs and says this helps the most.  Unclear if she has done physical therapy recently.  She apparently had an EMG performed and was told that she had some nerve damage of some sort.  She has failed gabapentin.    She has not tried Topamax, amitriptyline or duloxetine.    ROS: She reports fatigability, weight gain, itching, headaches, head trauma, appetite change, diarrhea, stomach pain, nausea, easy bruising, joint pain, urgency, frequency, painful urination, joint stiffness, joint swelling, back pain, seizures, dizziness, difficulty sleeping and loss of balance.  Balance of review of systems is negative.    Past Medical History:   Diagnosis Date    Seizures     Tuberous sclerosis        Past Surgical History:   Procedure Laterality Date    KIDNEY TRANSPLANT         Social  "History     Socioeconomic History    Marital status:      Spouse name: Not on file    Number of children: Not on file    Years of education: Not on file    Highest education level: Not on file   Occupational History    Not on file   Social Needs    Financial resource strain: Not on file    Food insecurity:     Worry: Not on file     Inability: Not on file    Transportation needs:     Medical: Not on file     Non-medical: Not on file   Tobacco Use    Smoking status: Never Smoker    Smokeless tobacco: Never Used   Substance and Sexual Activity    Alcohol use: No    Drug use: No    Sexual activity: Never     Partners: Male   Lifestyle    Physical activity:     Days per week: Not on file     Minutes per session: Not on file    Stress: Not on file   Relationships    Social connections:     Talks on phone: Not on file     Gets together: Not on file     Attends [employer] service: Not on file     Active member of club or organization: Not on file     Attends meetings of clubs or organizations: Not on file     Relationship status: Not on file   Other Topics Concern    Not on file   Social History Narrative    Not on file         Medications/Allergies: See med card    Vitals:    05/21/19 1111   BP: 110/80   Pulse: 88   Weight: 109.9 kg (242 lb 4.6 oz)   Height: 4' 11" (1.499 m)   PainSc:   9   PainLoc: Ankle         Physical exam:  Gen: A and O x3, pleasant, well-groomed, morbidly obese  Skin: No rashes or obvious lesions  HEENT: PERRLA, no obvious deformities on ears or in canals. Trachea midline.  CVS: Regular rate and rhythm, normal palpable pulses.  Resp:No increased work of breathing, symmetrical chest rise.  Abdomen: Soft, NT/ND.  Musculoskeletal: Slow, wide-based gait.  Ambulating with a cane.    Neuro:  Lower extremities: 5/5 strength bilaterally  Reflexes: Patellar 1+, Achilles 1+ bilaterally.  Sensory:  Intact and symmetrical to light touch and pinprick in L2-S1 dermatomes " bilaterally.    Lumbar spine:  Lumbar spine: Range of motion is moderately reduced with both flexion and extension with increased pain on both maneuvers.  Vinny's test causes no increased pain on either side.    Supine straight leg raise is negative bilaterally.    Internal and external rotation of the hip causes no increase  Myofascial exam: Tenderness to palpation across lumbar paraspinous muscles, cervical paraspinous region, thoracic paraspinous region.    Right ankle, no major deformities noted but does have pain with passive range of motion of the joint.  Passive range of motion with bilateral knees causes discomfort.  No swelling or erythema about the joints.      Imagin18 Xray  Cervical spine:  Sclerosis is noted involving the vertebral bodies which appear well aligned.  Intervertebral disc heights appear well preserved.  No obvious fracture or dislocation is noted.  Osseous demineralization may be questioned.  The atlas and odontoid appear in good relationship to each other.  The osseous neural foramina appear relatively well preserved.  Lumbar spine:  Surgical clips and postsurgical lines are noted involving the abdomen.  Sclerosis is noted that appears to be involving the vertebral bodies.  Vertebral body alignment appears adequate.  Vertebral body heights appear well preserved.  Intervertebral disc heights appear well preserved.    18 Xray right ankle: There is a small ossific density present adjacent to the tip of the medial malleolus compatible with an age-indeterminate medial malleolar avulsion injury, possibly remote.  Please correlate clinically.  There is Achilles insertion calcaneal enthesophyte formation.  There is a small plantar calcaneal spur.  The ankle mortise width is symmetric.  No talar dome osteochondral lesion.  No soft tissue abnormality.    18 Bilateral knee xray:  No radiographically evident acute fracture, dislocation, or osseous destructive process.  There is  mild left knee medial tibiofemoral joint space narrowing with AP standing positioning of the knees.  The patient is not optimally positioned for evaluation of the patellofemoral joint compartment as a result of limited patient ability/cooperation (per the performing technologist).  No chondrocalcinosis.  No left or right knee joint effusion.    Assessment:   The patient is a 44-year-old woman with a history of tuberous sclerosis, kidney transplant, seizure disorder, bipolar disorder and chronic nerve pain who presents in self-referral.    1. Central pain syndrome     2. Tuberous sclerosis     3. Chronic pain of both knees     4. Chronic pain of right ankle     5. Muscle spasms of both lower extremities     6. Low back pain, non-specific           Plan:  1.  I reviewed her case with Dr. Malik and he has provided a prescription for Lyrica 100 mg 3 times a day.  We will have her slowly increased from 50 mg a day to 100 mg a day and I have given her instructions for this.  I have also refilled her tizanidine 4 mg twice a day and refilled her lidocaine jelly 2%.  We discussed her reported abnormal EMG which we do not have records of so I have had her sign a release for [facility] in [facility] to receive this report.  2.  She will follow-up in 3 months or sooner as needed.    Greater than 50% of this 40 min visit was spent on counseling the patient.

## 2019-05-24 ENCOUNTER — TELEPHONE (OUTPATIENT)
Dept: PAIN MEDICINE | Facility: CLINIC | Age: 44
End: 2019-05-24

## 2019-05-24 NOTE — TELEPHONE ENCOUNTER
----- Message from Stacey Lacy sent at 5/24/2019  1:42 PM CDT -----  Contact: pt  Calling in regards to questions about last appointment and medication to be sent to insurance company for approval  and please advise.       lidocaine HCL 2% (XYLOCAINE) 2 % jelly 30 mL 5    Apply topically 2 (two) times daily as needed. - Topical (Top)  [id number] - [place], [place] - [address] [facility]  Contact pt once it has been sent or if any questions [phone] (home)

## 2019-05-28 NOTE — TELEPHONE ENCOUNTER
Attempted to call patient twice at number listed. Lidocaine 2% approved. Auth number: [id number] valid for 3 months through 8/26/2019

## 2019-07-22 ENCOUNTER — TELEPHONE (OUTPATIENT)
Dept: PAIN MEDICINE | Facility: CLINIC | Age: 44
End: 2019-07-22

## 2019-08-19 ENCOUNTER — OFFICE VISIT (OUTPATIENT)
Dept: PAIN MEDICINE | Facility: CLINIC | Age: 44
End: 2019-08-19
Payer: MEDICARE

## 2019-08-19 VITALS
TEMPERATURE: 97 F | DIASTOLIC BLOOD PRESSURE: 100 MMHG | HEART RATE: 98 BPM | SYSTOLIC BLOOD PRESSURE: 141 MMHG | OXYGEN SATURATION: 96 % | RESPIRATION RATE: 20 BRPM

## 2019-08-19 DIAGNOSIS — G89.0 CENTRAL PAIN SYNDROME: ICD-10-CM

## 2019-08-19 DIAGNOSIS — M25.571 CHRONIC PAIN OF RIGHT ANKLE: Primary | ICD-10-CM

## 2019-08-19 DIAGNOSIS — G89.29 CHRONIC PAIN OF RIGHT ANKLE: Primary | ICD-10-CM

## 2019-08-19 DIAGNOSIS — Q85.1 TUBEROUS SCLEROSIS: ICD-10-CM

## 2019-08-19 PROCEDURE — 99999 PR PBB SHADOW E&M-EST. PATIENT-LVL V: CPT | Mod: PBBFAC,,, | Performed by: ANESTHESIOLOGY

## 2019-08-19 PROCEDURE — 99213 OFFICE O/P EST LOW 20 MIN: CPT | Mod: S$PBB,,, | Performed by: ANESTHESIOLOGY

## 2019-08-19 PROCEDURE — 99999 PR PBB SHADOW E&M-EST. PATIENT-LVL V: ICD-10-PCS | Mod: PBBFAC,,, | Performed by: ANESTHESIOLOGY

## 2019-08-19 PROCEDURE — 99213 PR OFFICE/OUTPT VISIT, EST, LEVL III, 20-29 MIN: ICD-10-PCS | Mod: S$PBB,,, | Performed by: ANESTHESIOLOGY

## 2019-08-19 PROCEDURE — 99215 OFFICE O/P EST HI 40 MIN: CPT | Mod: PBBFAC,PN | Performed by: ANESTHESIOLOGY

## 2019-08-19 RX ORDER — LEVOTHYROXINE SODIUM 150 UG/1
150 TABLET ORAL DAILY
COMMUNITY

## 2019-08-19 RX ORDER — LAMOTRIGINE 25 MG/1
25 TABLET ORAL 2 TIMES DAILY
COMMUNITY

## 2019-08-19 NOTE — PROGRESS NOTES
This note was completed with dictation software and grammatical errors may exist.    CC: Pain in multiple locations    HPI: The patient is a 44-year-old woman with a history of tuberous sclerosis, kidney transplant, seizure disorder, bipolar disorder and chronic nerve pain who presents in self-referral.  She returns in follow-up today, she recently had surgery on her right ankle, states that it was more extensive than she thought it was going to be.  She states that she has a great deal of pain from this.  She is currently wearing a boot.  She has followed up with her surgeon, Dr. Linares.  She is currently taking hydrocodone 10/325 several times a day and has taken some oxycodone 10 mg sparingly.  Her foot pain is her biggest complaint at this point, states that she did not realize how painful this was going to be.    Previous history:  She returns in follow-up today with multiple pain complaints.  Since her last visit she has seen Dr. Linares and plans on having right ankle surgery.  She continues to have pain throughout the right side of her body, low back pain, bilateral knee pain and severe pain in both feet.  She describes her bilateral foot pain as stinging, throbbing and pulsating.  She denies any relief with Lyrica 50 mg 3 times a day.  She reports about 40% improvement in her muscle tightness and spasms with tizanidine.    Pain intervention history: She has previously seen pain management physicians in the past and it looks like she was taking oxycodone and OxyContin.  She may have taken some low-dose gabapentin in the past, unclear relief.  She did not feel that this was all that helpful.  She has been taking Zanaflex with some relief.  She uses Biofreeze on her legs and says this helps the most.  Unclear if she has done physical therapy recently.  She apparently had an EMG performed and was told that she had some nerve damage of some sort.  She has failed gabapentin.    She has not tried Topamax,  amitriptyline or duloxetine.    ROS: She reports fatigability, weight gain, itching, headaches, head trauma, appetite change, diarrhea, stomach pain, nausea, easy bruising, joint pain, urgency, frequency, painful urination, joint stiffness, joint swelling, back pain, seizures, dizziness, difficulty sleeping and loss of balance.  Balance of review of systems is negative.    Past Medical History:   Diagnosis Date    Seizures     Tuberous sclerosis        Past Surgical History:   Procedure Laterality Date    KIDNEY TRANSPLANT         Social History     Socioeconomic History    Marital status:      Spouse name: Not on file    Number of children: Not on file    Years of education: Not on file    Highest education level: Not on file   Occupational History    Not on file   Social Needs    Financial resource strain: Not on file    Food insecurity:     Worry: Not on file     Inability: Not on file    Transportation needs:     Medical: Not on file     Non-medical: Not on file   Tobacco Use    Smoking status: Never Smoker    Smokeless tobacco: Never Used   Substance and Sexual Activity    Alcohol use: No    Drug use: No    Sexual activity: Never     Partners: Male   Lifestyle    Physical activity:     Days per week: Not on file     Minutes per session: Not on file    Stress: Not on file   Relationships    Social connections:     Talks on phone: Not on file     Gets together: Not on file     Attends Rastafarian service: Not on file     Active member of club or organization: Not on file     Attends meetings of clubs or organizations: Not on file     Relationship status: Not on file   Other Topics Concern    Not on file   Social History Narrative    Not on file         Medications/Allergies: See med card    Vitals:    08/19/19 1151   BP: (!) 141/100   Pulse: 98   Resp: 20   Temp: 97.1 °F (36.2 °C)   TempSrc: Oral   SpO2: 96%   PainSc:   8   PainLoc: Ankle         Physical exam:  Gen: A and O x3,  pleasant, well-groomed, morbidly obese  Skin: No rashes or obvious lesions  HEENT: PERRLA, no obvious deformities on ears or in canals. Trachea midline.  CVS: Regular rate and rhythm, normal palpable pulses.  Resp:No increased work of breathing, symmetrical chest rise.  Abdomen: Soft, NT/ND.  Musculoskeletal:  Using a rolling scooter.  Right foot in surgical boot    Neuro:  Lower extremities: 5/5 strength bilaterally, right foot not tested  Reflexes: Patellar 1+, Achilles 1+ bilaterally.  Sensory:  Intact and symmetrical to light touch and pinprick in L2-S1 dermatomes bilaterally.    Lumbar spine:  Lumbar spine: Range of motion is moderately reduced with both flexion and extension with increased pain on both maneuvers.  Vinny's test causes no increased pain on either side.    Supine straight leg raise is negative bilaterally.    Internal and external rotation of the hip causes no increase  Myofascial exam: Tenderness to palpation across lumbar paraspinous muscles, cervical paraspinous region, thoracic paraspinous region.          Imagin18 Xray  Cervical spine:  Sclerosis is noted involving the vertebral bodies which appear well aligned.  Intervertebral disc heights appear well preserved.  No obvious fracture or dislocation is noted.  Osseous demineralization may be questioned.  The atlas and odontoid appear in good relationship to each other.  The osseous neural foramina appear relatively well preserved.  Lumbar spine:  Surgical clips and postsurgical lines are noted involving the abdomen.  Sclerosis is noted that appears to be involving the vertebral bodies.  Vertebral body alignment appears adequate.  Vertebral body heights appear well preserved.  Intervertebral disc heights appear well preserved.    18 Xray right ankle: There is a small ossific density present adjacent to the tip of the medial malleolus compatible with an age-indeterminate medial malleolar avulsion injury, possibly remote.  Please  correlate clinically.  There is Achilles insertion calcaneal enthesophyte formation.  There is a small plantar calcaneal spur.  The ankle mortise width is symmetric.  No talar dome osteochondral lesion.  No soft tissue abnormality.    7/18/18 Bilateral knee xray:  No radiographically evident acute fracture, dislocation, or osseous destructive process.  There is mild left knee medial tibiofemoral joint space narrowing with AP standing positioning of the knees.  The patient is not optimally positioned for evaluation of the patellofemoral joint compartment as a result of limited patient ability/cooperation (per the performing technologist).  No chondrocalcinosis.  No left or right knee joint effusion.    Assessment:   The patient is a 44-year-old woman with a history of tuberous sclerosis, kidney transplant, seizure disorder, bipolar disorder and chronic nerve pain who presents in self-referral.    1. Chronic pain of right ankle     2. Tuberous sclerosis     3. Central pain syndrome         Plan:  1.  I explained that I am not going to take over medications for her current postsurgical pain because I do not want to cover up anything that may be a concern or complication from the surgery.  If she is still having a great deal pain one month out from now, we may consider prescribing opioid pain medications but with the intent to wean off because I do not think she should be on chronic opioids.  I will have her follow up in two months or sooner as needed.

## 2019-09-03 RX ORDER — PREGABALIN 100 MG/1
100 CAPSULE ORAL 3 TIMES DAILY
Qty: 90 CAPSULE | Refills: 2 | Status: SHIPPED | OUTPATIENT
Start: 2019-09-03 | End: 2020-03-03

## 2023-12-11 NOTE — TELEPHONE ENCOUNTER
----- Message from Mitzi Pope sent at 1/24/2019  4:17 PM CST -----  Contact: Patient  Patient is calling to let the doctor know that she found a doctor closer to home, please send the records to the patient and also her new doctor from the release she has already signed.  The new doctor is:  [facility], Dr. Gianna Soares, Phone#[phone]; Fax#[phone].  She needs to speak with someone regarding her left knee.  Please call to discuss.  Call Back#[phone]  Thanks    Telephone Information:   Mobile [phone]     Okay to leave a message containing results? Yes    Patients mother called to update the patient tested positive for strep. Mom advised to change toothbrush 24 hours after starting the antibiotic. Mom verbalized understanding.